# Patient Record
Sex: FEMALE | Race: WHITE | NOT HISPANIC OR LATINO | Employment: FULL TIME | URBAN - METROPOLITAN AREA
[De-identification: names, ages, dates, MRNs, and addresses within clinical notes are randomized per-mention and may not be internally consistent; named-entity substitution may affect disease eponyms.]

---

## 2018-03-09 ENCOUNTER — HOSPITAL ENCOUNTER (OUTPATIENT)
Dept: PREADMISSION TESTING | Facility: HOSPITAL | Age: 33
Discharge: HOME | End: 2018-03-09
Attending: PLASTIC SURGERY | Admitting: PLASTIC SURGERY

## 2019-02-01 ENCOUNTER — HOSPITAL ENCOUNTER (OUTPATIENT)
Dept: LAB | Facility: HOSPITAL | Age: 34
Discharge: HOME | End: 2019-02-01
Attending: NURSE PRACTITIONER | Admitting: NURSE PRACTITIONER

## 2019-07-01 RX ORDER — ESCITALOPRAM OXALATE 10 MG/1
TABLET ORAL
Qty: 90 TABLET | Refills: 0 | Status: SHIPPED | OUTPATIENT
Start: 2019-07-01 | End: 2019-10-01 | Stop reason: SDUPTHER

## 2019-10-01 RX ORDER — ESCITALOPRAM OXALATE 10 MG/1
TABLET ORAL
Qty: 90 TABLET | Refills: 3 | Status: SHIPPED | OUTPATIENT
Start: 2019-10-01 | End: 2019-11-06

## 2019-11-06 ENCOUNTER — OFFICE VISIT (OUTPATIENT)
Dept: FAMILY MEDICINE CLINIC | Facility: CLINIC | Age: 34
End: 2019-11-06

## 2019-11-06 VITALS
HEART RATE: 87 BPM | SYSTOLIC BLOOD PRESSURE: 124 MMHG | BODY MASS INDEX: 36.58 KG/M2 | DIASTOLIC BLOOD PRESSURE: 84 MMHG | HEIGHT: 69 IN | TEMPERATURE: 97.7 F | OXYGEN SATURATION: 97 % | WEIGHT: 247 LBS

## 2019-11-06 DIAGNOSIS — F41.9 ANXIETY: Primary | ICD-10-CM

## 2019-11-06 PROBLEM — F32.A DEPRESSION: Status: ACTIVE | Noted: 2018-04-11

## 2019-11-06 PROCEDURE — 99213 OFFICE O/P EST LOW 20 MIN: CPT | Performed by: FAMILY MEDICINE

## 2019-11-06 RX ORDER — ESCITALOPRAM OXALATE 20 MG/1
20 TABLET ORAL DAILY
Qty: 90 TABLET | Refills: 3 | Status: SHIPPED | OUTPATIENT
Start: 2019-11-06 | End: 2021-01-07 | Stop reason: SDUPTHER

## 2019-11-06 RX ORDER — MULTIVITAMIN
1 CAPSULE ORAL DAILY
COMMUNITY
Start: 2018-04-11

## 2019-11-06 NOTE — ASSESSMENT & PLAN NOTE
Worsening symptoms recently.  Going through a divorce.  She has been to see a counselor. Increase Lexapro from 10 mg to 20 mg.

## 2019-11-06 NOTE — PROGRESS NOTES
Subjective   Chief Complaint   Patient presents with   • Anxiety     Lexapro not helping as much anymore     Tania Wolfe is a 33 y.o. female.     H/O depression but anxiety has become worse recently. Recent divorce.      Anxiety   Presents for initial visit. Onset was 1 to 6 months ago. The problem has been gradually worsening. Symptoms include depressed mood and nervous/anxious behavior. Patient reports no chest pain, dizziness, palpitations, shortness of breath or suicidal ideas. Symptoms occur most days. The severity of symptoms is causing significant distress. The symptoms are aggravated by family issues.     Her past medical history is significant for depression. There is no history of suicide attempts. Past treatments include SSRIs. The treatment provided moderate relief. Compliance with prior treatments has been good.      Past Medical History:   Diagnosis Date   • Atrial fibrillation (CMS/HCC)     during pregnancies   • Bell palsy 01/21/2016   • Depression      Past Surgical History:   Procedure Laterality Date   • BREAST SURGERY  2018    breast reduction   • FIBULA SOFT TISSUE BIOPSY  1999    cyst removed     No Known Allergies  Social History     Socioeconomic History   • Marital status:      Spouse name: Not on file   • Number of children: Not on file   • Years of education: Not on file   • Highest education level: Not on file   Tobacco Use   • Smoking status: Never Smoker   • Smokeless tobacco: Never Used   Substance and Sexual Activity   • Alcohol use: Yes     Frequency: Monthly or less     Comment: caffeine 2c qd     Social History     Tobacco Use   Smoking Status Never Smoker   Smokeless Tobacco Never Used       family history is not on file. She was adopted.  Current Outpatient Medications on File Prior to Visit   Medication Sig Dispense Refill   • Multiple Vitamin (MULTIVITAMIN) capsule MULTIVITAMINS CAPS     • [DISCONTINUED] escitalopram (LEXAPRO) 10 MG tablet TAKE 1 TABLET BY MOUTH ONE  "TIME A DAY  90 tablet 3     No current facility-administered medications on file prior to visit.      Patient Active Problem List   Diagnosis   • Depression   • Anxiety       The following portions of the patient's history were reviewed and updated as appropriate: allergies, current medications, past family history, past medical history, past social history, past surgical history and problem list.    Review of Systems   Constitutional: Negative for chills and fever.   HENT: Negative for sinus pressure and sore throat.    Eyes: Negative for blurred vision.   Respiratory: Negative for cough and shortness of breath.    Cardiovascular: Negative for chest pain and palpitations.   Gastrointestinal: Negative for abdominal pain.   Endocrine: Negative for polyuria.   Skin: Negative for rash.   Neurological: Negative for dizziness and headache.   Hematological: Negative for adenopathy.   Psychiatric/Behavioral: Positive for depressed mood. Negative for suicidal ideas. The patient is nervous/anxious.        Objective   /84 (BP Location: Left arm, Patient Position: Sitting, Cuff Size: Adult)   Pulse 87   Temp 97.7 °F (36.5 °C) (Oral)   Ht 175.9 cm (69.25\")   Wt 112 kg (247 lb)   SpO2 97%   BMI 36.21 kg/m²   Physical Exam   Constitutional: She is oriented to person, place, and time. She appears well-developed. No distress.   HENT:   Head: Normocephalic.   Eyes: Conjunctivae and lids are normal.   Neck: Trachea normal. No thyroid mass and no thyromegaly present.   Cardiovascular: Normal rate, regular rhythm and normal heart sounds.   Pulmonary/Chest: Effort normal and breath sounds normal.   Lymphadenopathy:     She has no cervical adenopathy.   Neurological: She is alert and oriented to person, place, and time.   Skin: Skin is warm and dry.   Psychiatric: Her speech is normal and behavior is normal. She exhibits a depressed mood. She is attentive.       No visits with results within 1 Week(s) from this visit. "   Latest known visit with results is:   No results found for any previous visit.           Assessment/Plan   Problems Addressed this Visit        Other    Anxiety - Primary     Worsening symptoms recently.  Going through a divorce.  She has been to see a counselor. Increase Lexapro from 10 mg to 20 mg.                Tania was seen today for anxiety.    Diagnoses and all orders for this visit:    Anxiety    Other orders  -     escitalopram (LEXAPRO) 20 MG tablet; Take 1 tablet by mouth Daily.

## 2020-08-11 ENCOUNTER — OFFICE VISIT (OUTPATIENT)
Dept: FAMILY MEDICINE CLINIC | Facility: CLINIC | Age: 35
End: 2020-08-11

## 2020-08-11 ENCOUNTER — LAB (OUTPATIENT)
Dept: FAMILY MEDICINE CLINIC | Facility: CLINIC | Age: 35
End: 2020-08-11

## 2020-08-11 VITALS
BODY MASS INDEX: 36.95 KG/M2 | OXYGEN SATURATION: 96 % | SYSTOLIC BLOOD PRESSURE: 146 MMHG | TEMPERATURE: 97.3 F | WEIGHT: 252 LBS | HEART RATE: 83 BPM | DIASTOLIC BLOOD PRESSURE: 84 MMHG

## 2020-08-11 DIAGNOSIS — F32.A DEPRESSION, UNSPECIFIED DEPRESSION TYPE: Primary | ICD-10-CM

## 2020-08-11 DIAGNOSIS — Z13.6 SCREENING, ISCHEMIC HEART DISEASE: ICD-10-CM

## 2020-08-11 DIAGNOSIS — Z83.49 FAMILY HISTORY OF THYROID DISEASE: ICD-10-CM

## 2020-08-11 DIAGNOSIS — N92.1 MENORRHAGIA WITH IRREGULAR CYCLE: ICD-10-CM

## 2020-08-11 LAB
ALBUMIN SERPL-MCNC: 4.2 G/DL (ref 3.5–5.2)
ALBUMIN/GLOB SERPL: 1.4 G/DL
ALP SERPL-CCNC: 76 U/L (ref 39–117)
ALT SERPL W P-5'-P-CCNC: 19 U/L (ref 1–33)
ANION GAP SERPL CALCULATED.3IONS-SCNC: 11.6 MMOL/L (ref 5–15)
AST SERPL-CCNC: 19 U/L (ref 1–32)
BASOPHILS # BLD AUTO: 0.02 10*3/MM3 (ref 0–0.2)
BASOPHILS NFR BLD AUTO: 0.2 % (ref 0–1.5)
BILIRUB SERPL-MCNC: 0.7 MG/DL (ref 0–1.2)
BUN SERPL-MCNC: 12 MG/DL (ref 6–20)
BUN/CREAT SERPL: 20.3 (ref 7–25)
CALCIUM SPEC-SCNC: 9.8 MG/DL (ref 8.6–10.5)
CHLORIDE SERPL-SCNC: 101 MMOL/L (ref 98–107)
CHOLEST SERPL-MCNC: 181 MG/DL (ref 0–200)
CO2 SERPL-SCNC: 25.4 MMOL/L (ref 22–29)
CREAT SERPL-MCNC: 0.59 MG/DL (ref 0.57–1)
DEPRECATED RDW RBC AUTO: 37.8 FL (ref 37–54)
EOSINOPHIL # BLD AUTO: 0.26 10*3/MM3 (ref 0–0.4)
EOSINOPHIL NFR BLD AUTO: 2.8 % (ref 0.3–6.2)
ERYTHROCYTE [DISTWIDTH] IN BLOOD BY AUTOMATED COUNT: 12.7 % (ref 12.3–15.4)
GFR SERPL CREATININE-BSD FRML MDRD: 117 ML/MIN/1.73
GLOBULIN UR ELPH-MCNC: 3.1 GM/DL
GLUCOSE SERPL-MCNC: 132 MG/DL (ref 65–99)
HCT VFR BLD AUTO: 40.7 % (ref 34–46.6)
HDLC SERPL-MCNC: 31 MG/DL (ref 40–60)
HGB BLD-MCNC: 13.4 G/DL (ref 12–15.9)
IMM GRANULOCYTES # BLD AUTO: 0.08 10*3/MM3 (ref 0–0.05)
IMM GRANULOCYTES NFR BLD AUTO: 0.9 % (ref 0–0.5)
LDLC SERPL CALC-MCNC: 96 MG/DL (ref 0–100)
LDLC/HDLC SERPL: 3.1 {RATIO}
LYMPHOCYTES # BLD AUTO: 2.93 10*3/MM3 (ref 0.7–3.1)
LYMPHOCYTES NFR BLD AUTO: 32 % (ref 19.6–45.3)
MCH RBC QN AUTO: 27 PG (ref 26.6–33)
MCHC RBC AUTO-ENTMCNC: 32.9 G/DL (ref 31.5–35.7)
MCV RBC AUTO: 82.1 FL (ref 79–97)
MONOCYTES # BLD AUTO: 0.59 10*3/MM3 (ref 0.1–0.9)
MONOCYTES NFR BLD AUTO: 6.4 % (ref 5–12)
NEUTROPHILS NFR BLD AUTO: 5.28 10*3/MM3 (ref 1.7–7)
NEUTROPHILS NFR BLD AUTO: 57.7 % (ref 42.7–76)
NRBC BLD AUTO-RTO: 0 /100 WBC (ref 0–0.2)
PLATELET # BLD AUTO: 223 10*3/MM3 (ref 140–450)
PMV BLD AUTO: 11.3 FL (ref 6–12)
POTASSIUM SERPL-SCNC: 4.2 MMOL/L (ref 3.5–5.2)
PROT SERPL-MCNC: 7.3 G/DL (ref 6–8.5)
RBC # BLD AUTO: 4.96 10*6/MM3 (ref 3.77–5.28)
SODIUM SERPL-SCNC: 138 MMOL/L (ref 136–145)
TRIGL SERPL-MCNC: 270 MG/DL (ref 0–150)
TSH SERPL DL<=0.05 MIU/L-ACNC: 2.38 UIU/ML (ref 0.27–4.2)
VLDLC SERPL-MCNC: 54 MG/DL (ref 5–40)
WBC # BLD AUTO: 9.16 10*3/MM3 (ref 3.4–10.8)

## 2020-08-11 PROCEDURE — 99214 OFFICE O/P EST MOD 30 MIN: CPT | Performed by: FAMILY MEDICINE

## 2020-08-11 PROCEDURE — 84439 ASSAY OF FREE THYROXINE: CPT | Performed by: FAMILY MEDICINE

## 2020-08-11 PROCEDURE — 84480 ASSAY TRIIODOTHYRONINE (T3): CPT | Performed by: FAMILY MEDICINE

## 2020-08-11 PROCEDURE — 84443 ASSAY THYROID STIM HORMONE: CPT | Performed by: FAMILY MEDICINE

## 2020-08-11 PROCEDURE — 36415 COLL VENOUS BLD VENIPUNCTURE: CPT | Performed by: FAMILY MEDICINE

## 2020-08-11 PROCEDURE — 85025 COMPLETE CBC W/AUTO DIFF WBC: CPT | Performed by: FAMILY MEDICINE

## 2020-08-11 PROCEDURE — 80053 COMPREHEN METABOLIC PANEL: CPT | Performed by: FAMILY MEDICINE

## 2020-08-11 PROCEDURE — 80061 LIPID PANEL: CPT | Performed by: FAMILY MEDICINE

## 2020-08-11 NOTE — PROGRESS NOTES
Subjective   Chief Complaint   Patient presents with   • Hot Flashes     wants her thyroid checked    • Fatigue     Tania Wolfe is a 34 y.o. female.     She is adopted but through a genetic testing kit she has been able to find her biologic father and half-sisters.  She now know that there is a strong family history of thyroid disease and she has had some symptoms.     Fatigue   This is a new problem. The current episode started more than 1 month ago. The problem occurs constantly. The problem has been unchanged. Associated symptoms include fatigue. Pertinent negatives include no abdominal pain, chest pain, chills, coughing, fever, headaches, myalgias, nausea, rash, sore throat or swollen glands. Nothing aggravates the symptoms. She has tried nothing for the symptoms.   Depression   Visit Type: follow-up  Patient presents with the following symptoms: anhedonia.  Patient is not experiencing: decreased concentration, depressed mood, insomnia, palpitations, psychomotor agitation, psychomotor retardation, restlessness and shortness of breath.  Frequency of symptoms: rarely   Severity: mild   Sleep quality: fair  Compliance with medications:  %        Menstrual Problem   This is a new problem. The current episode started more than 1 month ago. The problem has been unchanged. Associated symptoms include fatigue. Pertinent negatives include no abdominal pain, chest pain, chills, coughing, fever, headaches, myalgias, nausea, rash, sore throat or swollen glands.      Past Medical History:   Diagnosis Date   • Atrial fibrillation (CMS/HCC)     during pregnancies   • Bell palsy 01/21/2016   • Depression      Past Surgical History:   Procedure Laterality Date   • BREAST SURGERY  2018    breast reduction   • FIBULA SOFT TISSUE BIOPSY  1999    cyst removed     No Known Allergies  Social History     Socioeconomic History   • Marital status:      Spouse name: Not on file   • Number of children: Not on file   •  Years of education: Not on file   • Highest education level: Not on file   Tobacco Use   • Smoking status: Never Smoker   • Smokeless tobacco: Never Used   Substance and Sexual Activity   • Alcohol use: Yes     Frequency: Monthly or less     Comment: caffeine 2c qd     Social History     Tobacco Use   Smoking Status Never Smoker   Smokeless Tobacco Never Used       family history is not on file. She was adopted.  Current Outpatient Medications on File Prior to Visit   Medication Sig Dispense Refill   • escitalopram (LEXAPRO) 20 MG tablet Take 1 tablet by mouth Daily. 90 tablet 3   • Multiple Vitamin (MULTIVITAMIN) capsule MULTIVITAMINS CAPS       No current facility-administered medications on file prior to visit.      Patient Active Problem List   Diagnosis   • Depression   • Anxiety       The following portions of the patient's history were reviewed and updated as appropriate: allergies, current medications, past family history, past medical history, past social history, past surgical history and problem list.    Review of Systems   Constitutional: Positive for fatigue. Negative for chills and fever.   HENT: Negative for sinus pressure, sore throat and swollen glands.    Eyes: Negative for blurred vision.   Respiratory: Negative for cough and shortness of breath.    Cardiovascular: Negative for chest pain and palpitations.   Gastrointestinal: Negative for abdominal pain and nausea.   Endocrine: Negative for polyuria.   Genitourinary: Positive for menstrual problem.   Musculoskeletal: Negative for myalgias.   Skin: Negative for rash.   Neurological: Negative for dizziness and headache.   Hematological: Negative for adenopathy.   Psychiatric/Behavioral: Negative for decreased concentration and depressed mood. The patient does not have insomnia.        Objective   /84 (BP Location: Left arm, Patient Position: Sitting, Cuff Size: Adult)   Pulse 83   Temp 97.3 °F (36.3 °C)   Wt 114 kg (252 lb)   SpO2 96%    BMI 36.95 kg/m²   Physical Exam   Constitutional: She is oriented to person, place, and time. She appears well-developed. No distress.   HENT:   Head: Normocephalic.   Eyes: Conjunctivae and lids are normal.   Neck: Trachea normal. No thyroid mass and no thyromegaly present.   Cardiovascular: Normal rate, regular rhythm and normal heart sounds.   Pulmonary/Chest: Effort normal and breath sounds normal.   Lymphadenopathy:     She has no cervical adenopathy.   Neurological: She is alert and oriented to person, place, and time.   Skin: Skin is warm and dry.   Psychiatric: She has a normal mood and affect. Her speech is normal and behavior is normal. She is attentive.       No visits with results within 1 Week(s) from this visit.   Latest known visit with results is:   No results found for any previous visit.           Assessment/Plan   Problems Addressed this Visit        Other    Depression - Primary    Relevant Orders    TSH    CBC & Differential      Other Visit Diagnoses     Family history of thyroid disease        Relevant Orders    TSH    Screening, ischemic heart disease        Relevant Orders    Comprehensive Metabolic Panel    Lipid Panel    Menorrhagia with irregular cycle        Relevant Orders    TSH    Comprehensive Metabolic Panel    CBC & Differential

## 2020-08-12 DIAGNOSIS — Z83.49 FAMILY HISTORY OF THYROID DISEASE: Primary | ICD-10-CM

## 2020-08-12 LAB
T3 SERPL-MCNC: 127 NG/DL (ref 80–200)
T4 FREE SERPL-MCNC: 1.02 NG/DL (ref 0.93–1.7)

## 2021-01-07 ENCOUNTER — TELEMEDICINE (OUTPATIENT)
Dept: FAMILY MEDICINE CLINIC | Facility: TELEHEALTH | Age: 36
End: 2021-01-07

## 2021-01-07 DIAGNOSIS — H66.001 NON-RECURRENT ACUTE SUPPURATIVE OTITIS MEDIA OF RIGHT EAR WITHOUT SPONTANEOUS RUPTURE OF TYMPANIC MEMBRANE: Primary | ICD-10-CM

## 2021-01-07 PROCEDURE — 99213 OFFICE O/P EST LOW 20 MIN: CPT | Performed by: NURSE PRACTITIONER

## 2021-01-07 RX ORDER — AMOXICILLIN AND CLAVULANATE POTASSIUM 875; 125 MG/1; MG/1
1 TABLET, FILM COATED ORAL 2 TIMES DAILY
Qty: 14 TABLET | Refills: 0 | Status: SHIPPED | OUTPATIENT
Start: 2021-01-07 | End: 2021-01-14

## 2021-01-07 NOTE — PROGRESS NOTES
Chief Complaint  Earache    Subjective          Tania Wolfe presents to [facility] for   Pt reports left earache x 5 days that has gradually worsened. She did have some sinus congestion initially, but that has resolved. She has a muffled sensation, cracking/popping with yawning and feels like fluid is in the ear. She has an otoscope and reports her TM is red. Denies fever, ottorhea, history of chronic ear infections.     Earache   There is pain in the left ear. This is a new problem. Episode onset: 5 days. The problem occurs constantly. The problem has been gradually worsening. There has been no fever. The pain is moderate. Associated symptoms include hearing loss (muffled). Pertinent negatives include no abdominal pain, coughing, diarrhea, ear discharge, headaches, neck pain, rash, rhinorrhea, sore throat or vomiting. Associated symptoms comments: Nasal congestion-resolved  . Treatments tried: sudafed, nasal spray, zyrtec, ear drops for pain. The treatment provided mild relief.       Objective   Vital Signs:   There were no vitals taken for this visit.    Physical Exam  Constitutional:       General: She is not in acute distress.     Appearance: Normal appearance. She is not ill-appearing, toxic-appearing or diaphoretic.   HENT:      Head: Normocephalic and atraumatic.      Right Ear: External ear normal. Tenderness (when pushing in front of the ear) present. No drainage.      Ears:      Comments: Viewed picture of TM from pt's otoscope. TM is erythematous, TM intact, no rupture.      Nose: No congestion or rhinorrhea.   Pulmonary:      Effort: Pulmonary effort is normal.   Neurological:      Mental Status: She is alert and oriented to person, place, and time.   Psychiatric:         Mood and Affect: Mood normal.         Speech: Speech normal.         Behavior: Behavior normal.        Result Review :                 Assessment and Plan    Problem List Items Addressed This Visit      None      Visit Diagnoses     Non-recurrent acute suppurative otitis media of right ear without spontaneous rupture of tympanic membrane    -  Primary    Relevant Medications    amoxicillin-clavulanate (Augmentin) 875-125 MG per tablet      Continue to use Flonase and allergy medicine of choice.   Alternate tylenol and motrin for pain and/or fever, stay hydrated and rest.   If symptoms worsen or do not improve follow up with your PCP or visit your nearest Urgent Care Center or ER.    I spent 25 minutes caring for Tania on this date of service. This time includes time spent by me in the following activities:preparing for the visit, obtaining and/or reviewing a separately obtained history, performing a medically appropriate examination and/or evaluation , counseling and educating the patient/family/caregiver, ordering medications, tests, or procedures and documenting information in the medical record  Follow Up   No follow-ups on file.  Patient was given instructions and counseling regarding her condition or for health maintenance advice. Please see specific information pulled into the AVS if appropriate.

## 2021-01-07 NOTE — PATIENT INSTRUCTIONS
Continue to use Flonase and allergy medicine of choice.   Alternate tylenol and motrin for pain and/or fever, stay hydrated and rest.   If symptoms worsen or do not improve follow up with your PCP or visit your nearest Urgent Care Center or ER.        Otitis Media, Adult    Otitis media occurs when there is inflammation and fluid in the middle ear. Your middle ear is a part of the ear that contains bones for hearing as well as air that helps send sounds to your brain.  What are the causes?  This condition is caused by a blockage in the eustachian tube. This tube drains fluid from the ear to the back of the nose (nasopharynx). A blockage in this tube can be caused by an object or by swelling (edema) in the tube. Problems that can cause a blockage include:  · A cold or other upper respiratory infection.  · Allergies.  · An irritant, such as tobacco smoke.  · Enlarged adenoids. The adenoids are areas of soft tissue located high in the back of the throat, behind the nose and the roof of the mouth.  · A mass in the nasopharynx.  · Damage to the ear caused by pressure changes (barotrauma).  What are the signs or symptoms?  Symptoms of this condition include:  · Ear pain.  · A fever.  · Decreased hearing.  · A headache.  · Tiredness (lethargy).  · Fluid leaking from the ear.  · Ringing in the ear.  How is this diagnosed?  This condition is diagnosed with a physical exam. During the exam your health care provider will use an instrument called an otoscope to look into your ear and check for redness, swelling, and fluid. He or she will also ask about your symptoms.  Your health care provider may also order tests, such as:  · A test to check the movement of the eardrum (pneumatic otoscopy). This test is done by squeezing a small amount of air into the ear.  · A test that changes air pressure in the middle ear to check how well the eardrum moves and whether the eustachian tube is working (tympanogram).  How is this  treated?  This condition usually goes away on its own within 3-5 days. But if the condition is caused by a bacteria infection and does not go away own its own, or keeps coming back, your health care provider may:  · Prescribe antibiotic medicines to treat the infection.  · Prescribe or recommend medicines to control pain.  Follow these instructions at home:  · Take over-the-counter and prescription medicines only as told by your health care provider.  · If you were prescribed an antibiotic medicine, take it as told by your health care provider. Do not stop taking the antibiotic even if you start to feel better.  · Keep all follow-up visits as told by your health care provider. This is important.  Contact a health care provider if:  · You have bleeding from your nose.  · There is a lump on your neck.  · You are not getting better in 5 days.  · You feel worse instead of better.  Get help right away if:  · You have severe pain that is not controlled with medicine.  · You have swelling, redness, or pain around your ear.  · You have stiffness in your neck.  · A part of your face is paralyzed.  · The bone behind your ear (mastoid) is tender when you touch it.  · You develop a severe headache.  Summary  · Otitis media is redness, soreness, and swelling of the middle ear.  · This condition usually goes away on its own within 3-5 days.  · If the problem does not go away in 3-5 days, your health care provider may prescribe or recommend medicines to treat your symptoms.  · If you were prescribed an antibiotic medicine, take it as told by your health care provider.  This information is not intended to replace advice given to you by your health care provider. Make sure you discuss any questions you have with your health care provider.  Document Revised: 11/30/2018 Document Reviewed: 12/08/2017  [employer] Patient Education © 2020 [employer]

## 2021-01-08 RX ORDER — ESCITALOPRAM OXALATE 20 MG/1
20 TABLET ORAL DAILY
Qty: 90 TABLET | Refills: 3 | OUTPATIENT
Start: 2021-01-08

## 2021-01-08 RX ORDER — ESCITALOPRAM OXALATE 20 MG/1
20 TABLET ORAL DAILY
Qty: 90 TABLET | Refills: 3 | Status: SHIPPED | OUTPATIENT
Start: 2021-01-08 | End: 2021-09-09 | Stop reason: SDUPTHER

## 2021-05-26 ENCOUNTER — TELEPHONE (OUTPATIENT)
Dept: CARDIOLOGY | Facility: CLINIC | Age: 36
End: 2021-05-26

## 2021-06-03 ENCOUNTER — LAB (OUTPATIENT)
Dept: LAB | Facility: HOSPITAL | Age: 36
End: 2021-06-03

## 2021-06-03 ENCOUNTER — OFFICE VISIT (OUTPATIENT)
Dept: CARDIOLOGY | Facility: CLINIC | Age: 36
End: 2021-06-03

## 2021-06-03 VITALS
HEART RATE: 88 BPM | DIASTOLIC BLOOD PRESSURE: 86 MMHG | SYSTOLIC BLOOD PRESSURE: 132 MMHG | HEIGHT: 70 IN | OXYGEN SATURATION: 99 % | BODY MASS INDEX: 36.22 KG/M2 | WEIGHT: 253 LBS

## 2021-06-03 DIAGNOSIS — I48.0 PAROXYSMAL ATRIAL FIBRILLATION (HCC): ICD-10-CM

## 2021-06-03 DIAGNOSIS — R53.83 FATIGUE, UNSPECIFIED TYPE: ICD-10-CM

## 2021-06-03 DIAGNOSIS — E66.9 OBESITY (BMI 30-39.9): ICD-10-CM

## 2021-06-03 DIAGNOSIS — R73.9 HYPERGLYCEMIA: ICD-10-CM

## 2021-06-03 DIAGNOSIS — R00.2 PALPITATIONS: Primary | ICD-10-CM

## 2021-06-03 DIAGNOSIS — E78.5 DYSLIPIDEMIA: ICD-10-CM

## 2021-06-03 DIAGNOSIS — R00.2 PALPITATIONS: ICD-10-CM

## 2021-06-03 LAB
ALBUMIN SERPL-MCNC: 4.2 G/DL (ref 3.5–5.2)
ALBUMIN/GLOB SERPL: 1.4 G/DL
ALP SERPL-CCNC: 79 U/L (ref 39–117)
ALT SERPL W P-5'-P-CCNC: 13 U/L (ref 1–33)
ANION GAP SERPL CALCULATED.3IONS-SCNC: 8.2 MMOL/L (ref 5–15)
AST SERPL-CCNC: 16 U/L (ref 1–32)
BILIRUB SERPL-MCNC: 0.6 MG/DL (ref 0–1.2)
BUN SERPL-MCNC: 10 MG/DL (ref 6–20)
BUN/CREAT SERPL: 13.2 (ref 7–25)
CALCIUM SPEC-SCNC: 9.2 MG/DL (ref 8.6–10.5)
CHLORIDE SERPL-SCNC: 103 MMOL/L (ref 98–107)
CO2 SERPL-SCNC: 26.8 MMOL/L (ref 22–29)
CREAT SERPL-MCNC: 0.76 MG/DL (ref 0.57–1)
GFR SERPL CREATININE-BSD FRML MDRD: 87 ML/MIN/1.73
GLOBULIN UR ELPH-MCNC: 3 GM/DL
GLUCOSE SERPL-MCNC: 98 MG/DL (ref 65–99)
HBA1C MFR BLD: 5.8 % (ref 3.5–5.6)
MAGNESIUM SERPL-MCNC: 2 MG/DL (ref 1.6–2.6)
POTASSIUM SERPL-SCNC: 4 MMOL/L (ref 3.5–5.2)
PROT SERPL-MCNC: 7.2 G/DL (ref 6–8.5)
SODIUM SERPL-SCNC: 138 MMOL/L (ref 136–145)
TSH SERPL DL<=0.05 MIU/L-ACNC: 2.07 UIU/ML (ref 0.27–4.2)

## 2021-06-03 PROCEDURE — 80053 COMPREHEN METABOLIC PANEL: CPT

## 2021-06-03 PROCEDURE — 83735 ASSAY OF MAGNESIUM: CPT

## 2021-06-03 PROCEDURE — 84443 ASSAY THYROID STIM HORMONE: CPT

## 2021-06-03 PROCEDURE — 83036 HEMOGLOBIN GLYCOSYLATED A1C: CPT

## 2021-06-03 PROCEDURE — 93000 ELECTROCARDIOGRAM COMPLETE: CPT | Performed by: INTERNAL MEDICINE

## 2021-06-03 PROCEDURE — 36415 COLL VENOUS BLD VENIPUNCTURE: CPT

## 2021-06-03 PROCEDURE — 99204 OFFICE O/P NEW MOD 45 MIN: CPT | Performed by: INTERNAL MEDICINE

## 2021-06-03 RX ORDER — CETIRIZINE HYDROCHLORIDE 10 MG/1
10 TABLET ORAL DAILY PRN
COMMUNITY
End: 2022-09-13

## 2021-06-03 RX ORDER — DILTIAZEM HYDROCHLORIDE 120 MG/1
120 CAPSULE, COATED, EXTENDED RELEASE ORAL DAILY
Qty: 30 CAPSULE | Refills: 11 | Status: SHIPPED | OUTPATIENT
Start: 2021-06-03 | End: 2021-06-28

## 2021-06-03 NOTE — PROGRESS NOTES
Cardiology Consult Note    Patient Identification:  Name: Tania Wolfe  Age: 35 y.o.  Sex: female  :  1985  MRN: [id number]             Requesting Physician : Dr. Madison Cruz    Reason for Consultation / Chief Complaint : Palpitations, A. fib    History of Present Illness:      This is a 35-year-old with PMH of    Paroxysmal atrial fibrillation  Facial droop, Bell's palsy  Dyslipidemia with low HDL at 31  Depression  Obesity with BMI over 36  Breast surgery  NKDA  Positive family history of CAD in mother    Here for evaluation and treatment of A. fib and palpitations.  Patient reportedly had A. fib during pregnancy in  and repeat in .  States she had no documented A. fib since .  Has been having pounding sensation and skipped beats with shortness of breath and fatigue.  Patient reportedly took metoprolol and Cardizem to help better.  Denies any loss of consciousness.  Patient's arterial blood pressure is 132/86, heart rate 88 bpm, O2 sat of 99% on room air.  BMI is over 36.  Labs from 2020 revealed normal TSH, T3, free T4, CMP with a glucose elevated at 132, lipid profile with cholesterol 181 triglycerides 270 HDL low at 31 LDL 96.      Assessment:      Palpitations  Fatigue  Hyperglycemia  Paroxysmal A. Fib  Dyslipidemia with low HDL      Recommendations / Plan:        Reviewed EKG results with patient  We will check labs including TSH magnesium and CMP to evaluate palpitations.  Patient has hyperglycemia we will check A1c.  We will check echocardiogram to rule out structural heart disease.  Patient's [id number] score is low, therefore we will hold off on anticoagulation.  Reviewed low HDL counseled on walking exercise.  Reviewed BMI over 36 counseled on weight loss diet and exercise.           Diagnosis Plan   1. Palpitations  ECG 12 Lead    Adult Transthoracic Echo Complete W/ Cont if Necessary Per Protocol    Hemoglobin A1c    TSH    Magnesium    Comprehensive Metabolic  Panel   2. Fatigue, unspecified type  Adult Transthoracic Echo Complete W/ Cont if Necessary Per Protocol    Hemoglobin A1c    TSH    Magnesium    Comprehensive Metabolic Panel   3. Hyperglycemia  Adult Transthoracic Echo Complete W/ Cont if Necessary Per Protocol    Hemoglobin A1c    TSH    Magnesium    Comprehensive Metabolic Panel   4. Paroxysmal atrial fibrillation (CMS/HCC)  Adult Transthoracic Echo Complete W/ Cont if Necessary Per Protocol    Hemoglobin A1c    TSH    Magnesium    Comprehensive Metabolic Panel   5. Dyslipidemia     6. Obesity (BMI 30-39.9)                Past Medical History:  Past Medical History:   Diagnosis Date   • Atrial fibrillation (CMS/HCC)     during pregnancies   • Bell palsy 01/21/2016   • Depression      Past Surgical History:  Past Surgical History:   Procedure Laterality Date   • BREAST SURGERY  2018    breast reduction   • FIBULA SOFT TISSUE BIOPSY  1999    cyst removed      Allergies:  No Known Allergies  Home Meds:    Current Meds:     Current Outpatient Medications:   •  cetirizine (zyrTEC) 10 MG tablet, Take 10 mg by mouth Daily., Disp: , Rfl:   •  escitalopram (Lexapro) 20 MG tablet, Take 1 tablet by mouth Daily., Disp: 90 tablet, Rfl: 3  •  Multiple Vitamin (MULTIVITAMIN) capsule, MULTIVITAMINS CAPS, Disp: , Rfl:   •  Rhubarb (ESTROVEN MENOPAUSE RELIEF PO), , Disp: , Rfl:   •  dilTIAZem CD (CARDIZEM CD) 120 MG 24 hr capsule, Take 1 capsule by mouth Daily., Disp: 30 capsule, Rfl: 11  Social History:   Social History     Tobacco Use   • Smoking status: Never Smoker   • Smokeless tobacco: Never Used   Substance Use Topics   • Alcohol use: Yes     Comment: occ      Family History:  Family History   Adopted: Yes   Problem Relation Age of Onset   • Heart disease Mother    • Thyroid disease Father         Review of Systems : Review of Systems   Constitutional: Negative for fever and malaise/fatigue.   HENT: Negative for congestion and hearing loss.    Eyes: Negative for double  "vision and visual disturbance.   Cardiovascular: Positive for palpitations. Negative for chest pain, claudication, dyspnea on exertion, leg swelling and syncope.   Respiratory: Positive for shortness of breath. Negative for cough.    Endocrine: Negative for cold intolerance.   Skin: Negative for color change and rash.   Musculoskeletal: Negative for arthritis and joint pain.   Gastrointestinal: Negative for abdominal pain and heartburn.   Genitourinary: Negative for hematuria.   Neurological: Positive for excessive daytime sleepiness. Negative for dizziness.   Psychiatric/Behavioral: Positive for depression. The patient is nervous/anxious.    All other systems reviewed and are negative.              Constitutional:       Physical Exam   /86   Pulse 88   Ht 177.8 cm (70\")   Wt 115 kg (253 lb)   SpO2 99%   BMI 36.30 kg/m²   Physical Exam  General:  Appears in no acute distress, pleasant obese  Eyes: Sclera is anicteric,  conjunctiva is clear   HEENT:  No JVD. Thyroid not visibly enlarged. No mucosal pallor or cyanosis  Respiratory: Respirations regular and unlabored at rest.  Bilaterally good breath sounds, with good air entry in all fields. No crackles, rubs or wheezes auscultated  Cardiovascular: S1,S2 Regular rate and rhythm. No murmur, rub or gallop auscultated. No pretibial pitting edema  Gastrointestinal: Abdomen soft, flat, non tender. Bowel sounds present.   Musculoskeletal:  No abnormal movements  Extremities: No digital clubbing or cyanosis  Skin: Color pink. Skin warm and dry to touch. No rashes  No xanthoma  Neuro: Alert and awake, no lateralizing deficits appreciated    Cardiographics  ECG: EKG tracing was  personally reviewed/interpreted by me    ECG 12 Lead    Date/Time: 6/3/2021 1:12 PM  Performed by: Erlin Rahman MD  Authorized by: Erlin Rahman MD   Comparison: compared with previous ECG from 3/9/2018  Comparison to previous ECG: EKG done today reviewed/interpreted " by me shows sinus rhythm with rate of 81 bpm, no new change compared to EKG from 3/9/2018            Imaging  Chest X-ray:   Imaging Results (Last 24 Hours)     ** No results found for the last 24 hours. **          Lab Review: I have reviewed the labs              @[email or web address]@                  [facility] Lyndon Rahman MD  6/11/2021, 16:57 EDT      EMR Dragon/Transcription:   Dictated utilizing Dragon dictation

## 2021-06-25 NOTE — TELEPHONE ENCOUNTER
Pt called she's been on Cardizem 120mg daily for three wks still having resting -120.         Should be working by now?       Used to take Labetalol and it worked well, should she change meds?

## 2021-06-28 RX ORDER — LABETALOL 100 MG/1
100 TABLET, FILM COATED ORAL 2 TIMES DAILY
COMMUNITY
End: 2021-06-28 | Stop reason: SDUPTHER

## 2021-06-28 RX ORDER — LABETALOL 100 MG/1
100 TABLET, FILM COATED ORAL 2 TIMES DAILY
Qty: 180 TABLET | Refills: 1 | OUTPATIENT
Start: 2021-06-28 | End: 2021-10-12 | Stop reason: HOSPADM

## 2021-06-28 NOTE — TELEPHONE ENCOUNTER
100mg bid per Dr Rahman  Called informed pt,   Called into [facility]        Called in ,    Please sign

## 2021-06-29 ENCOUNTER — HOSPITAL ENCOUNTER (OUTPATIENT)
Dept: CARDIOLOGY | Facility: HOSPITAL | Age: 36
Discharge: HOME | End: 2021-06-29
Admitting: INTERNAL MEDICINE

## 2021-06-29 VITALS
WEIGHT: 253 LBS | BODY MASS INDEX: 36.22 KG/M2 | SYSTOLIC BLOOD PRESSURE: 117 MMHG | HEIGHT: 70 IN | DIASTOLIC BLOOD PRESSURE: 59 MMHG

## 2021-06-29 DIAGNOSIS — R73.9 HYPERGLYCEMIA: ICD-10-CM

## 2021-06-29 DIAGNOSIS — R53.83 FATIGUE, UNSPECIFIED TYPE: ICD-10-CM

## 2021-06-29 DIAGNOSIS — R00.2 PALPITATIONS: ICD-10-CM

## 2021-06-29 DIAGNOSIS — I48.0 PAROXYSMAL ATRIAL FIBRILLATION (HCC): ICD-10-CM

## 2021-06-29 LAB
BH CV ECHO MEAS - ACS: 2.2 CM
BH CV ECHO MEAS - AO MAX PG (FULL): 3.4 MMHG
BH CV ECHO MEAS - AO MAX PG: 8.8 MMHG
BH CV ECHO MEAS - AO MEAN PG (FULL): 1.8 MMHG
BH CV ECHO MEAS - AO MEAN PG: 4.8 MMHG
BH CV ECHO MEAS - AO ROOT AREA (BSA CORRECTED): 1.2
BH CV ECHO MEAS - AO ROOT AREA: 5.7 CM^2
BH CV ECHO MEAS - AO ROOT DIAM: 2.7 CM
BH CV ECHO MEAS - AO V2 MAX: 148.2 CM/SEC
BH CV ECHO MEAS - AO V2 MEAN: 105 CM/SEC
BH CV ECHO MEAS - AO V2 VTI: 28.6 CM
BH CV ECHO MEAS - ASC AORTA: 3 CM
BH CV ECHO MEAS - AVA(I,A): 2.2 CM^2
BH CV ECHO MEAS - AVA(I,D): 2.2 CM^2
BH CV ECHO MEAS - AVA(V,A): 2.1 CM^2
BH CV ECHO MEAS - AVA(V,D): 2.1 CM^2
BH CV ECHO MEAS - BSA(HAYCOCK): 2.4 M^2
BH CV ECHO MEAS - BSA: 2.3 M^2
BH CV ECHO MEAS - BZI_BMI: 35.9 KILOGRAMS/M^2
BH CV ECHO MEAS - BZI_METRIC_HEIGHT: 177.8 CM
BH CV ECHO MEAS - BZI_METRIC_WEIGHT: 113.4 KG
BH CV ECHO MEAS - EDV(CUBED): 110 ML
BH CV ECHO MEAS - EDV(MOD-SP4): 65.1 ML
BH CV ECHO MEAS - EDV(TEICH): 107 ML
BH CV ECHO MEAS - EF(CUBED): 87.2 %
BH CV ECHO MEAS - EF(MOD-SP4): 68.4 %
BH CV ECHO MEAS - EF(TEICH): 80.8 %
BH CV ECHO MEAS - ESV(CUBED): 14.1 ML
BH CV ECHO MEAS - ESV(MOD-SP4): 20.6 ML
BH CV ECHO MEAS - ESV(TEICH): 20.5 ML
BH CV ECHO MEAS - FS: 49.5 %
BH CV ECHO MEAS - IVS/LVPW: 1
BH CV ECHO MEAS - IVSD: 1 CM
BH CV ECHO MEAS - LA DIMENSION: 3.8 CM
BH CV ECHO MEAS - LA/AO: 1.4
BH CV ECHO MEAS - LV DIASTOLIC VOL/BSA (35-75): 28.4 ML/M^2
BH CV ECHO MEAS - LV MASS(C)D: 181.2 GRAMS
BH CV ECHO MEAS - LV MASS(C)DI: 79 GRAMS/M^2
BH CV ECHO MEAS - LV MAX PG: 5.4 MMHG
BH CV ECHO MEAS - LV MEAN PG: 3 MMHG
BH CV ECHO MEAS - LV SYSTOLIC VOL/BSA (12-30): 9 ML/M^2
BH CV ECHO MEAS - LV V1 MAX: 115.9 CM/SEC
BH CV ECHO MEAS - LV V1 MEAN: 82.2 CM/SEC
BH CV ECHO MEAS - LV V1 VTI: 24.1 CM
BH CV ECHO MEAS - LVIDD: 4.8 CM
BH CV ECHO MEAS - LVIDS: 2.4 CM
BH CV ECHO MEAS - LVOT AREA: 2.6 CM^2
BH CV ECHO MEAS - LVOT DIAM: 1.8 CM
BH CV ECHO MEAS - LVPWD: 1.1 CM
BH CV ECHO MEAS - MV A MAX VEL: 58.8 CM/SEC
BH CV ECHO MEAS - MV DEC SLOPE: 283.7 CM/SEC^2
BH CV ECHO MEAS - MV DEC TIME: 0.28 SEC
BH CV ECHO MEAS - MV E MAX VEL: 79.5 CM/SEC
BH CV ECHO MEAS - MV E/A: 1.4
BH CV ECHO MEAS - MV MAX PG: 3 MMHG
BH CV ECHO MEAS - MV MEAN PG: 1 MMHG
BH CV ECHO MEAS - MV V2 MAX: 86.4 CM/SEC
BH CV ECHO MEAS - MV V2 MEAN: 47.2 CM/SEC
BH CV ECHO MEAS - MV V2 VTI: 26.1 CM
BH CV ECHO MEAS - MVA(VTI): 2.4 CM^2
BH CV ECHO MEAS - PA ACC TIME: 0.11 SEC
BH CV ECHO MEAS - PA PR(ACCEL): 30.7 MMHG
BH CV ECHO MEAS - RV MAX PG: 3.1 MMHG
BH CV ECHO MEAS - RV MEAN PG: 1.8 MMHG
BH CV ECHO MEAS - RV V1 MAX: 88.3 CM/SEC
BH CV ECHO MEAS - RV V1 MEAN: 65 CM/SEC
BH CV ECHO MEAS - RV V1 VTI: 14.6 CM
BH CV ECHO MEAS - RVDD: 2.9 CM
BH CV ECHO MEAS - SI(AO): 71.3 ML/M^2
BH CV ECHO MEAS - SI(CUBED): 41.8 ML/M^2
BH CV ECHO MEAS - SI(LVOT): 27.7 ML/M^2
BH CV ECHO MEAS - SI(MOD-SP4): 19.4 ML/M^2
BH CV ECHO MEAS - SI(TEICH): 37.7 ML/M^2
BH CV ECHO MEAS - SV(AO): 163.7 ML
BH CV ECHO MEAS - SV(CUBED): 95.8 ML
BH CV ECHO MEAS - SV(LVOT): 63.5 ML
BH CV ECHO MEAS - SV(MOD-SP4): 44.5 ML
BH CV ECHO MEAS - SV(TEICH): 86.5 ML
MAXIMAL PREDICTED HEART RATE: 185 BPM
STRESS TARGET HR: 157 BPM

## 2021-06-29 PROCEDURE — 93306 TTE W/DOPPLER COMPLETE: CPT | Performed by: INTERNAL MEDICINE

## 2021-06-29 PROCEDURE — 93306 TTE W/DOPPLER COMPLETE: CPT

## 2021-09-09 ENCOUNTER — OFFICE VISIT (OUTPATIENT)
Dept: CARDIOLOGY | Facility: CLINIC | Age: 36
End: 2021-09-09

## 2021-09-09 VITALS
WEIGHT: 246 LBS | BODY MASS INDEX: 35.22 KG/M2 | HEIGHT: 70 IN | DIASTOLIC BLOOD PRESSURE: 78 MMHG | HEART RATE: 81 BPM | OXYGEN SATURATION: 98 % | SYSTOLIC BLOOD PRESSURE: 120 MMHG

## 2021-09-09 DIAGNOSIS — E78.5 DYSLIPIDEMIA: ICD-10-CM

## 2021-09-09 DIAGNOSIS — E66.9 OBESITY (BMI 30-39.9): ICD-10-CM

## 2021-09-09 DIAGNOSIS — R00.2 PALPITATIONS: Primary | ICD-10-CM

## 2021-09-09 DIAGNOSIS — I48.0 PAROXYSMAL ATRIAL FIBRILLATION (HCC): ICD-10-CM

## 2021-09-09 DIAGNOSIS — R73.9 HYPERGLYCEMIA: ICD-10-CM

## 2021-09-09 PROCEDURE — 99213 OFFICE O/P EST LOW 20 MIN: CPT | Performed by: INTERNAL MEDICINE

## 2021-09-09 RX ORDER — DILTIAZEM HYDROCHLORIDE 120 MG/1
120 CAPSULE, COATED, EXTENDED RELEASE ORAL DAILY
COMMUNITY
Start: 2021-07-13 | End: 2021-09-22 | Stop reason: SDUPTHER

## 2021-09-09 NOTE — PROGRESS NOTES
Subjective:     Encounter Date:09/09/2021      Patient ID: Tania Wolfe is a 35 y.o. female.    Chief Complaint : Follow-up for palpitations, A. 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  Patient reportedly had A. fib during pregnancy in 2008 and repeat in 2011.  States she had no documented A. fib since 2011.  No patient has a iWatch which shows a rhythm strip and when she is having palpitations she has printout showing A. fib but it lasts less than 10 beats at the time.  Patient's arterial blood pressure is 120/78, heart rate 80, O2 to sat of 90 % on room air.  BMI is over 35  Labs from August 2020 revealed normal TSH, T3, free T4, CMP with a glucose elevated at 132, lipid profile with cholesterol 181 triglycerides 270 HDL low at 31 LDL 96.  Labs from 6/3/2021 revealed TSH normal at 2.07, normal CMP normal magnesium.  A1c of 5.8.      Assessment:      Palpitations  Fatigue  Hyperglycemia, A1c of 5.8 suggestive of prediabetes  Paroxysmal A. Fib  Dyslipidemia with low HDL      Recommendations / Plan:        Reviewed A. fib with patient.  Patient had echocardiogram done 6/29/2021 which revealed normal LV systolic function  Patient's [id number] score is low, therefore we will hold off on anticoagulation.  Reviewed low HDL counseled on walking exercise.  Reviewed BMI over 35 counseled on weight loss diet and exercise.  We will continue Cardizem and labetalol as tolerated for paroxysmal A. fib.  Discussed about ablation and anticoagulation with patient.  If she develops hypertension or diabetes we will put her on long-term anticoagulation.  Discussed about COVID-19 vaccination.  Patient did well  with both the doses of   vaccine.  We will follow-up in 1 year or sooner if needed.        Procedures EKG done 6/3/2021 reviewed/interpreted by me reveals sinus rhythm with rate of 81 bpm    The following portions of the patient's history were reviewed and updated as appropriate: allergies, current medications, past family history, past medical history, past social history, past surgical history and problem list.    Assessment:         MDM     Diagnosis Plan   1. Palpitations     2. Paroxysmal atrial fibrillation (CMS/HCC)     3. Hyperglycemia     4. Dyslipidemia     5. Obesity (BMI 30-39.9)            Plan:               Past Medical History:  Past Medical History:   Diagnosis Date   • Atrial fibrillation (CMS/HCC)     during pregnancies   • Bell palsy 01/21/2016   • Depression      Past Surgical History:  Past Surgical History:   Procedure Laterality Date   • BREAST SURGERY  2018    breast reduction   • FIBULA SOFT TISSUE BIOPSY  1999    cyst removed      Allergies:  No Known Allergies  Home Meds:  Current Meds:     Current Outpatient Medications:   •  cetirizine (zyrTEC) 10 MG tablet, Take 10 mg by mouth Daily., Disp: , Rfl:   •  dilTIAZem CD (CARDIZEM CD) 120 MG 24 hr capsule, Take 120 mg by mouth Daily., Disp: , Rfl:   •  escitalopram (Lexapro) 20 MG tablet, Take 1 tablet by mouth Daily., Disp: 90 tablet, Rfl: 2  •  labetalol (NORMODYNE) 100 MG tablet, Take 1 tablet by mouth 2 (Two) Times a Day., Disp: 180 tablet, Rfl: 1  •  labetalol (NORMODYNE) 100 MG tablet, Take 1 tablet by mouth 2 (two) times a day., Disp: 180 tablet, Rfl: 1  •  Multiple Vitamin (MULTIVITAMIN) capsule, MULTIVITAMINS CAPS, Disp: , Rfl:   •  Rhubarb (ESTROVEN MENOPAUSE RELIEF PO), , Disp: , Rfl:   Social History:   Social History     Tobacco Use   • Smoking status: Never Smoker   • Smokeless tobacco: Never Used   Substance Use Topics   • Alcohol use: Yes     Comment: occ      Family History:  Family History   Adopted: Yes   Problem Relation  "Age of Onset   • Heart disease Mother    • Thyroid disease Father               Review of Systems   Constitutional: Negative for fever and malaise/fatigue.   HENT: Negative for congestion and hearing loss.    Eyes: Negative for double vision and visual disturbance.   Cardiovascular: Positive for palpitations. Negative for chest pain, claudication, dyspnea on exertion, leg swelling and syncope.   Respiratory: Negative for cough and shortness of breath.    Endocrine: Negative for cold intolerance.   Skin: Negative for color change and rash.   Musculoskeletal: Negative for arthritis and joint pain.   Gastrointestinal: Negative for abdominal pain and heartburn.   Genitourinary: Negative for hematuria.   Neurological: Negative for excessive daytime sleepiness, dizziness and numbness.   Psychiatric/Behavioral: Negative for depression. The patient is not nervous/anxious.    All other systems reviewed and are negative.         Objective:     Physical Exam  /78 (BP Location: Left arm, Patient Position: Sitting, Cuff Size: Large Adult)   Pulse 81   Ht 177.8 cm (70\")   Wt 112 kg (246 lb)   SpO2 98%   BMI 35.30 kg/m²   General:  Appears in no acute distress, pleasant obese  Eyes: Sclera is anicteric,  conjunctiva is clear   HEENT:  No JVD.  No carotid bruits  Respiratory: Respirations regular and unlabored at rest.  Clear to auscultation  Cardiovascular: S1,S2 Regular rate and rhythm. No murmur, rub or gallop auscultated.   Extremities: No digital clubbing or cyanosis, no edema  Skin: Color pink. Skin warm and dry to touch. No rashes  No xanthoma  Neuro: Alert and awake, no lateralizing deficits appreciated    Lab Reviewed:                  "

## 2021-09-10 ENCOUNTER — PATIENT MESSAGE (OUTPATIENT)
Dept: CARDIOLOGY | Facility: CLINIC | Age: 36
End: 2021-09-10

## 2021-09-14 DIAGNOSIS — I48.0 PAROXYSMAL ATRIAL FIBRILLATION (HCC): Primary | ICD-10-CM

## 2021-09-22 RX ORDER — DILTIAZEM HYDROCHLORIDE 120 MG/1
120 CAPSULE, COATED, EXTENDED RELEASE ORAL DAILY
Qty: 30 CAPSULE | Refills: 11 | Status: SHIPPED | OUTPATIENT
Start: 2021-09-22 | End: 2022-09-13

## 2021-09-22 RX ORDER — DILTIAZEM HYDROCHLORIDE 120 MG/1
120 CAPSULE, COATED, EXTENDED RELEASE ORAL DAILY
Qty: 30 CAPSULE | Refills: 11 | Status: CANCELLED | OUTPATIENT
Start: 2021-09-22

## 2021-09-22 NOTE — TELEPHONE ENCOUNTER
Rx Refill Note  Requested Prescriptions     Pending Prescriptions Disp Refills   • dilTIAZem CD (CARDIZEM CD) 120 MG 24 hr capsule 30 capsule 11     Sig: Take 1 capsule by mouth Daily.      Last office visit with prescribing clinician: 9/9/2021      Next office visit with prescribing clinician: 9/13/2022            Kady Novak MA  09/22/21, 16:26 EDT

## 2021-10-04 ENCOUNTER — OFFICE VISIT (OUTPATIENT)
Dept: CARDIOLOGY | Facility: CLINIC | Age: 36
End: 2021-10-04

## 2021-10-04 VITALS
OXYGEN SATURATION: 100 % | WEIGHT: 248 LBS | HEART RATE: 78 BPM | SYSTOLIC BLOOD PRESSURE: 139 MMHG | BODY MASS INDEX: 35.58 KG/M2 | DIASTOLIC BLOOD PRESSURE: 94 MMHG

## 2021-10-04 DIAGNOSIS — E78.5 DYSLIPIDEMIA: ICD-10-CM

## 2021-10-04 DIAGNOSIS — I48.0 PAROXYSMAL ATRIAL FIBRILLATION (HCC): Primary | ICD-10-CM

## 2021-10-04 DIAGNOSIS — R00.2 PALPITATIONS: ICD-10-CM

## 2021-10-04 DIAGNOSIS — I10 PRIMARY HYPERTENSION: ICD-10-CM

## 2021-10-04 PROCEDURE — 93000 ELECTROCARDIOGRAM COMPLETE: CPT | Performed by: INTERNAL MEDICINE

## 2021-10-04 PROCEDURE — 99203 OFFICE O/P NEW LOW 30 MIN: CPT | Performed by: INTERNAL MEDICINE

## 2021-10-04 NOTE — PROGRESS NOTES
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  Patient reportedly had A. fib during pregnancy in 2008 and repeat in 2011.  States she had no documented A. fib since 2011.   She has additional history of essential hypertension  She complains of ectopic beats  No syncope      Past Medical History:   Diagnosis Date   • Atrial fibrillation (HCC)     during pregnancies   • Bell palsy 01/21/2016   • Depression      Past Surgical History:   Procedure Laterality Date   • BREAST SURGERY  2018    breast reduction   • FIBULA SOFT TISSUE BIOPSY  1999    cyst removed       Review of Systems   General:  no fatigue and tiredness  Eyes: No redness  Cardiovascular: No chest pain, + for  palpitations        Physical Exam  VITALS REVIEWED    General:      well developed, well nourished, in no acute distress.    Head:      normocephalic and atraumatic.    Eyes:      PERRL/EOM intact, conjunctiva and sclera clear with out nystagmus.    Neck:      no masses, thyromegaly,  trachea central with normal respiratory effort and PMI displaced laterally  Lungs:      clear bilaterally to auscultation.    Heart:Sinus rhythm without any murmurs or gallops              Assessment:    Recent labs revealed normal magnesium, normal potassium 4.1 normal TSH  Palpitations consistent with Pac's--continue smart watch recordings  Fatigue  Hyperglycemia, A1c of 5.8 suggestive of prediabetes  Paroxysmal A. Fib  Dyslipidemia with low HDL  Patient had echocardiogram done 6/29/2021 which revealed normal LV systolic function  Patient reassured        ECG 12 Lead    Date/Time: 10/4/2021 2:19 PM  Performed by: John Crow MD  Authorized by: John Crow MD   Comparison: compared with previous ECG   Similar to previous  ECG  Rhythm: sinus rhythm  Rate: normal  QRS axis: normal            Electronically signed by John Crow MD, 10/04/21, 2:19 PM EDT.

## 2021-10-08 ENCOUNTER — PATIENT ROUNDING (BHMG ONLY) (OUTPATIENT)
Dept: CARDIOLOGY | Facility: CLINIC | Age: 36
End: 2021-10-08

## 2021-10-08 NOTE — PROGRESS NOTES
October 8, 2021    Hello, may I speak with Tania Wolfe?    My name is Allen    I am  with [facility] [address] [place] [address].    Before we get started may I verify your date of birth? 1985    I am calling to officially welcome you to our practice and ask about your recent visit. Is this a good time to talk? No    Tell me about your visit with us. What things went well?  n/a       We're always looking for ways to make our patients' experiences even better. Do you have recommendations on ways we may improve?  n/a    Overall were you satisfied with your first visit to our practice? n/a       I appreciate you taking the time to speak with me today. Is there anything else I can do for you? n/a    Thank you, and have a great day.

## 2021-10-09 ENCOUNTER — HOSPITAL ENCOUNTER (OUTPATIENT)
Facility: HOSPITAL | Age: 36
Setting detail: OBSERVATION
Discharge: HOME | End: 2021-10-12
Attending: HOSPITALIST | Admitting: INTERNAL MEDICINE

## 2021-10-09 ENCOUNTER — APPOINTMENT (OUTPATIENT)
Dept: GENERAL RADIOLOGY | Facility: HOSPITAL | Age: 36
End: 2021-10-09

## 2021-10-09 DIAGNOSIS — R42 LIGHTHEADED: ICD-10-CM

## 2021-10-09 DIAGNOSIS — R07.9 CHEST PAIN, UNSPECIFIED TYPE: Primary | ICD-10-CM

## 2021-10-09 DIAGNOSIS — R06.00 DYSPNEA, UNSPECIFIED TYPE: ICD-10-CM

## 2021-10-09 DIAGNOSIS — I48.91 ATRIAL FIBRILLATION WITH RAPID VENTRICULAR RESPONSE (HCC): ICD-10-CM

## 2021-10-09 LAB
ALBUMIN SERPL-MCNC: 4.5 G/DL (ref 3.5–5.2)
ALBUMIN/GLOB SERPL: 1.5 G/DL
ALP SERPL-CCNC: 96 U/L (ref 39–117)
ALT SERPL W P-5'-P-CCNC: 20 U/L (ref 1–33)
ANION GAP SERPL CALCULATED.3IONS-SCNC: 16 MMOL/L (ref 5–15)
APTT PPP: 28.4 SECONDS (ref 24–31)
AST SERPL-CCNC: 25 U/L (ref 1–32)
BASOPHILS # BLD AUTO: 0 10*3/MM3 (ref 0–0.2)
BASOPHILS NFR BLD AUTO: 0.3 % (ref 0–1.5)
BILIRUB SERPL-MCNC: 0.5 MG/DL (ref 0–1.2)
BUN SERPL-MCNC: 11 MG/DL (ref 6–20)
BUN/CREAT SERPL: 13.4 (ref 7–25)
CALCIUM SPEC-SCNC: 9.2 MG/DL (ref 8.6–10.5)
CHLORIDE SERPL-SCNC: 100 MMOL/L (ref 98–107)
CO2 SERPL-SCNC: 22 MMOL/L (ref 22–29)
CREAT SERPL-MCNC: 0.82 MG/DL (ref 0.57–1)
DEPRECATED RDW RBC AUTO: 38.1 FL (ref 37–54)
EOSINOPHIL # BLD AUTO: 0.3 10*3/MM3 (ref 0–0.4)
EOSINOPHIL NFR BLD AUTO: 2.6 % (ref 0.3–6.2)
ERYTHROCYTE [DISTWIDTH] IN BLOOD BY AUTOMATED COUNT: 13.5 % (ref 12.3–15.4)
GFR SERPL CREATININE-BSD FRML MDRD: 79 ML/MIN/1.73
GLOBULIN UR ELPH-MCNC: 3.1 GM/DL
GLUCOSE SERPL-MCNC: 160 MG/DL (ref 65–99)
HCT VFR BLD AUTO: 42 % (ref 34–46.6)
HGB BLD-MCNC: 14.5 G/DL (ref 12–15.9)
HOLD SPECIMEN: NORMAL
HOLD SPECIMEN: NORMAL
INR PPP: 0.98 (ref 0.93–1.1)
LYMPHOCYTES # BLD AUTO: 3.7 10*3/MM3 (ref 0.7–3.1)
LYMPHOCYTES NFR BLD AUTO: 34 % (ref 19.6–45.3)
MAGNESIUM SERPL-MCNC: 2 MG/DL (ref 1.6–2.6)
MAGNESIUM SERPL-MCNC: 2 MG/DL (ref 1.6–2.6)
MCH RBC QN AUTO: 27.9 PG (ref 26.6–33)
MCHC RBC AUTO-ENTMCNC: 34.5 G/DL (ref 31.5–35.7)
MCV RBC AUTO: 80.8 FL (ref 79–97)
MONOCYTES # BLD AUTO: 0.7 10*3/MM3 (ref 0.1–0.9)
MONOCYTES NFR BLD AUTO: 6.4 % (ref 5–12)
NEUTROPHILS NFR BLD AUTO: 56.7 % (ref 42.7–76)
NEUTROPHILS NFR BLD AUTO: 6.2 10*3/MM3 (ref 1.7–7)
NRBC BLD AUTO-RTO: 0.1 /100 WBC (ref 0–0.2)
NT-PROBNP SERPL-MCNC: 100.1 PG/ML (ref 0–450)
PLATELET # BLD AUTO: 246 10*3/MM3 (ref 140–450)
PMV BLD AUTO: 8.3 FL (ref 6–12)
POTASSIUM SERPL-SCNC: 3.5 MMOL/L (ref 3.5–5.2)
POTASSIUM SERPL-SCNC: 3.8 MMOL/L (ref 3.5–5.2)
PROT SERPL-MCNC: 7.6 G/DL (ref 6–8.5)
PROTHROMBIN TIME: 10.9 SECONDS (ref 9.6–11.7)
RBC # BLD AUTO: 5.2 10*6/MM3 (ref 3.77–5.28)
SODIUM SERPL-SCNC: 138 MMOL/L (ref 136–145)
T4 FREE SERPL-MCNC: 0.99 NG/DL (ref 0.93–1.7)
TROPONIN T SERPL-MCNC: <0.01 NG/ML (ref 0–0.03)
TROPONIN T SERPL-MCNC: <0.01 NG/ML (ref 0–0.03)
TSH SERPL DL<=0.05 MIU/L-ACNC: 4.44 UIU/ML (ref 0.27–4.2)
WBC # BLD AUTO: 11 10*3/MM3 (ref 3.4–10.8)
WHOLE BLOOD HOLD SPECIMEN: NORMAL

## 2021-10-09 PROCEDURE — 84484 ASSAY OF TROPONIN QUANT: CPT | Performed by: NURSE PRACTITIONER

## 2021-10-09 PROCEDURE — 80053 COMPREHEN METABOLIC PANEL: CPT | Performed by: NURSE PRACTITIONER

## 2021-10-09 PROCEDURE — 85025 COMPLETE CBC W/AUTO DIFF WBC: CPT | Performed by: NURSE PRACTITIONER

## 2021-10-09 PROCEDURE — 84443 ASSAY THYROID STIM HORMONE: CPT | Performed by: NURSE PRACTITIONER

## 2021-10-09 PROCEDURE — 85730 THROMBOPLASTIN TIME PARTIAL: CPT | Performed by: NURSE PRACTITIONER

## 2021-10-09 PROCEDURE — 83880 ASSAY OF NATRIURETIC PEPTIDE: CPT | Performed by: NURSE PRACTITIONER

## 2021-10-09 PROCEDURE — 99219 PR INITIAL OBSERVATION CARE/DAY 50 MINUTES: CPT | Performed by: NURSE PRACTITIONER

## 2021-10-09 PROCEDURE — U0003 INFECTIOUS AGENT DETECTION BY NUCLEIC ACID (DNA OR RNA); SEVERE ACUTE RESPIRATORY SYNDROME CORONAVIRUS 2 (SARS-COV-2) (CORONAVIRUS DISEASE [COVID-19]), AMPLIFIED PROBE TECHNIQUE, MAKING USE OF HIGH THROUGHPUT TECHNOLOGIES AS DESCRIBED BY CMS-2020-01-R: HCPCS | Performed by: HOSPITALIST

## 2021-10-09 PROCEDURE — 96376 TX/PRO/DX INJ SAME DRUG ADON: CPT

## 2021-10-09 PROCEDURE — 71045 X-RAY EXAM CHEST 1 VIEW: CPT

## 2021-10-09 PROCEDURE — 93005 ELECTROCARDIOGRAM TRACING: CPT | Performed by: HOSPITALIST

## 2021-10-09 PROCEDURE — 96375 TX/PRO/DX INJ NEW DRUG ADDON: CPT

## 2021-10-09 PROCEDURE — 96365 THER/PROPH/DIAG IV INF INIT: CPT

## 2021-10-09 PROCEDURE — 93005 ELECTROCARDIOGRAM TRACING: CPT

## 2021-10-09 PROCEDURE — 96366 THER/PROPH/DIAG IV INF ADDON: CPT

## 2021-10-09 PROCEDURE — 83735 ASSAY OF MAGNESIUM: CPT | Performed by: NURSE PRACTITIONER

## 2021-10-09 PROCEDURE — C9803 HOPD COVID-19 SPEC COLLECT: HCPCS

## 2021-10-09 PROCEDURE — G0378 HOSPITAL OBSERVATION PER HR: HCPCS

## 2021-10-09 PROCEDURE — 84132 ASSAY OF SERUM POTASSIUM: CPT | Performed by: NURSE PRACTITIONER

## 2021-10-09 PROCEDURE — 85610 PROTHROMBIN TIME: CPT | Performed by: NURSE PRACTITIONER

## 2021-10-09 PROCEDURE — 80061 LIPID PANEL: CPT | Performed by: NURSE PRACTITIONER

## 2021-10-09 PROCEDURE — 93005 ELECTROCARDIOGRAM TRACING: CPT | Performed by: NURSE PRACTITIONER

## 2021-10-09 PROCEDURE — 99285 EMERGENCY DEPT VISIT HI MDM: CPT

## 2021-10-09 PROCEDURE — 84439 ASSAY OF FREE THYROXINE: CPT | Performed by: NURSE PRACTITIONER

## 2021-10-09 RX ORDER — DILTIAZEM HYDROCHLORIDE 5 MG/ML
25 INJECTION INTRAVENOUS ONCE
Status: COMPLETED | OUTPATIENT
Start: 2021-10-09 | End: 2021-10-09

## 2021-10-09 RX ORDER — ONDANSETRON 4 MG/1
4 TABLET, FILM COATED ORAL EVERY 6 HOURS PRN
Status: DISCONTINUED | OUTPATIENT
Start: 2021-10-09 | End: 2021-10-12 | Stop reason: HOSPADM

## 2021-10-09 RX ORDER — ACETAMINOPHEN 650 MG/1
650 SUPPOSITORY RECTAL EVERY 4 HOURS PRN
Status: DISCONTINUED | OUTPATIENT
Start: 2021-10-09 | End: 2021-10-12 | Stop reason: HOSPADM

## 2021-10-09 RX ORDER — CHOLECALCIFEROL (VITAMIN D3) 125 MCG
5 CAPSULE ORAL NIGHTLY PRN
Status: DISCONTINUED | OUTPATIENT
Start: 2021-10-09 | End: 2021-10-12 | Stop reason: HOSPADM

## 2021-10-09 RX ORDER — CALCIUM CARBONATE 200(500)MG
2 TABLET,CHEWABLE ORAL 2 TIMES DAILY PRN
Status: DISCONTINUED | OUTPATIENT
Start: 2021-10-09 | End: 2021-10-12 | Stop reason: HOSPADM

## 2021-10-09 RX ORDER — METOPROLOL TARTRATE 5 MG/5ML
5 INJECTION INTRAVENOUS ONCE
Status: COMPLETED | OUTPATIENT
Start: 2021-10-09 | End: 2021-10-09

## 2021-10-09 RX ORDER — LABETALOL HYDROCHLORIDE 5 MG/ML
20 INJECTION, SOLUTION INTRAVENOUS ONCE
Status: DISCONTINUED | OUTPATIENT
Start: 2021-10-09 | End: 2021-10-09

## 2021-10-09 RX ORDER — DILTIAZEM HYDROCHLORIDE 5 MG/ML
15 INJECTION INTRAVENOUS ONCE
Status: COMPLETED | OUTPATIENT
Start: 2021-10-09 | End: 2021-10-09

## 2021-10-09 RX ORDER — ALUMINA, MAGNESIA, AND SIMETHICONE 2400; 2400; 240 MG/30ML; MG/30ML; MG/30ML
15 SUSPENSION ORAL EVERY 6 HOURS PRN
Status: DISCONTINUED | OUTPATIENT
Start: 2021-10-09 | End: 2021-10-12 | Stop reason: HOSPADM

## 2021-10-09 RX ORDER — MAGNESIUM SULFATE HEPTAHYDRATE 40 MG/ML
2 INJECTION, SOLUTION INTRAVENOUS AS NEEDED
Status: DISCONTINUED | OUTPATIENT
Start: 2021-10-09 | End: 2021-10-12 | Stop reason: HOSPADM

## 2021-10-09 RX ORDER — POTASSIUM CHLORIDE 20 MEQ/1
40 TABLET, EXTENDED RELEASE ORAL AS NEEDED
Status: DISCONTINUED | OUTPATIENT
Start: 2021-10-09 | End: 2021-10-12 | Stop reason: HOSPADM

## 2021-10-09 RX ORDER — POTASSIUM CHLORIDE 1.5 G/1.77G
40 POWDER, FOR SOLUTION ORAL AS NEEDED
Status: DISCONTINUED | OUTPATIENT
Start: 2021-10-09 | End: 2021-10-12 | Stop reason: HOSPADM

## 2021-10-09 RX ORDER — ASPIRIN 81 MG/1
324 TABLET, CHEWABLE ORAL ONCE
Status: COMPLETED | OUTPATIENT
Start: 2021-10-09 | End: 2021-10-09

## 2021-10-09 RX ORDER — NITROGLYCERIN 0.4 MG/1
0.4 TABLET SUBLINGUAL
Status: DISCONTINUED | OUTPATIENT
Start: 2021-10-09 | End: 2021-10-12 | Stop reason: HOSPADM

## 2021-10-09 RX ORDER — ONDANSETRON 2 MG/ML
4 INJECTION INTRAMUSCULAR; INTRAVENOUS EVERY 6 HOURS PRN
Status: DISCONTINUED | OUTPATIENT
Start: 2021-10-09 | End: 2021-10-12 | Stop reason: HOSPADM

## 2021-10-09 RX ORDER — DILTIAZEM HYDROCHLORIDE 5 MG/ML
INJECTION INTRAVENOUS
Status: COMPLETED
Start: 2021-10-09 | End: 2021-10-09

## 2021-10-09 RX ORDER — ACETAMINOPHEN 325 MG/1
650 TABLET ORAL EVERY 4 HOURS PRN
Status: DISCONTINUED | OUTPATIENT
Start: 2021-10-09 | End: 2021-10-12 | Stop reason: HOSPADM

## 2021-10-09 RX ORDER — SODIUM CHLORIDE 0.9 % (FLUSH) 0.9 %
10 SYRINGE (ML) INJECTION AS NEEDED
Status: DISCONTINUED | OUTPATIENT
Start: 2021-10-09 | End: 2021-10-12 | Stop reason: HOSPADM

## 2021-10-09 RX ORDER — ACETAMINOPHEN 160 MG/5ML
650 SOLUTION ORAL EVERY 4 HOURS PRN
Status: DISCONTINUED | OUTPATIENT
Start: 2021-10-09 | End: 2021-10-12 | Stop reason: HOSPADM

## 2021-10-09 RX ORDER — MAGNESIUM SULFATE 1 G/100ML
1 INJECTION INTRAVENOUS AS NEEDED
Status: DISCONTINUED | OUTPATIENT
Start: 2021-10-09 | End: 2021-10-12 | Stop reason: HOSPADM

## 2021-10-09 RX ORDER — METOPROLOL TARTRATE 5 MG/5ML
INJECTION INTRAVENOUS
Status: DISCONTINUED
Start: 2021-10-09 | End: 2021-10-09 | Stop reason: WASHOUT

## 2021-10-09 RX ADMIN — SODIUM CHLORIDE 5 MG/HR: 900 INJECTION, SOLUTION INTRAVENOUS at 21:51

## 2021-10-09 RX ADMIN — SODIUM CHLORIDE, POTASSIUM CHLORIDE, SODIUM LACTATE AND CALCIUM CHLORIDE 1000 ML: 600; 310; 30; 20 INJECTION, SOLUTION INTRAVENOUS at 21:11

## 2021-10-09 RX ADMIN — METOPROLOL TARTRATE 5 MG: 1 INJECTION, SOLUTION INTRAVENOUS at 21:16

## 2021-10-09 RX ADMIN — DILTIAZEM HYDROCHLORIDE 10 MG: 5 INJECTION INTRAVENOUS at 20:29

## 2021-10-09 RX ADMIN — ASPIRIN 81 MG CHEWABLE TABLET 324 MG: 81 TABLET CHEWABLE at 20:34

## 2021-10-09 RX ADMIN — DILTIAZEM HYDROCHLORIDE 15 MG: 5 INJECTION INTRAVENOUS at 21:45

## 2021-10-09 RX ADMIN — METOPROLOL TARTRATE 5 MG: 1 INJECTION, SOLUTION INTRAVENOUS at 20:33

## 2021-10-10 PROBLEM — I48.91 ATRIAL FIBRILLATION WITH RVR: Status: ACTIVE | Noted: 2021-10-10

## 2021-10-10 PROBLEM — J30.2 SEASONAL ALLERGIES: Status: ACTIVE | Noted: 2021-10-10

## 2021-10-10 PROBLEM — R73.9 ELEVATED SERUM GLUCOSE: Status: ACTIVE | Noted: 2021-10-10

## 2021-10-10 LAB
ANION GAP SERPL CALCULATED.3IONS-SCNC: 14 MMOL/L (ref 5–15)
BASOPHILS # BLD AUTO: 0 10*3/MM3 (ref 0–0.2)
BASOPHILS NFR BLD AUTO: 0.4 % (ref 0–1.5)
BUN SERPL-MCNC: 11 MG/DL (ref 6–20)
BUN/CREAT SERPL: 15.5 (ref 7–25)
CALCIUM SPEC-SCNC: 8.7 MG/DL (ref 8.6–10.5)
CHLORIDE SERPL-SCNC: 103 MMOL/L (ref 98–107)
CHOLEST SERPL-MCNC: 188 MG/DL (ref 0–200)
CO2 SERPL-SCNC: 22 MMOL/L (ref 22–29)
CREAT SERPL-MCNC: 0.71 MG/DL (ref 0.57–1)
DEPRECATED RDW RBC AUTO: 38.5 FL (ref 37–54)
EOSINOPHIL # BLD AUTO: 0.3 10*3/MM3 (ref 0–0.4)
EOSINOPHIL NFR BLD AUTO: 2.4 % (ref 0.3–6.2)
ERYTHROCYTE [DISTWIDTH] IN BLOOD BY AUTOMATED COUNT: 13.5 % (ref 12.3–15.4)
GFR SERPL CREATININE-BSD FRML MDRD: 94 ML/MIN/1.73
GLUCOSE SERPL-MCNC: 108 MG/DL (ref 65–99)
HCT VFR BLD AUTO: 38.6 % (ref 34–46.6)
HDLC SERPL-MCNC: 31 MG/DL (ref 40–60)
HGB BLD-MCNC: 13.1 G/DL (ref 12–15.9)
LDLC SERPL CALC-MCNC: 96 MG/DL (ref 0–100)
LDLC/HDLC SERPL: 2.7 {RATIO}
LYMPHOCYTES # BLD AUTO: 4.4 10*3/MM3 (ref 0.7–3.1)
LYMPHOCYTES NFR BLD AUTO: 42.6 % (ref 19.6–45.3)
MCH RBC QN AUTO: 27.5 PG (ref 26.6–33)
MCHC RBC AUTO-ENTMCNC: 34 G/DL (ref 31.5–35.7)
MCV RBC AUTO: 80.8 FL (ref 79–97)
MONOCYTES # BLD AUTO: 0.6 10*3/MM3 (ref 0.1–0.9)
MONOCYTES NFR BLD AUTO: 6.2 % (ref 5–12)
NEUTROPHILS NFR BLD AUTO: 48.4 % (ref 42.7–76)
NEUTROPHILS NFR BLD AUTO: 5 10*3/MM3 (ref 1.7–7)
NRBC BLD AUTO-RTO: 0.1 /100 WBC (ref 0–0.2)
PLATELET # BLD AUTO: 223 10*3/MM3 (ref 140–450)
PMV BLD AUTO: 8.6 FL (ref 6–12)
POTASSIUM SERPL-SCNC: 3.5 MMOL/L (ref 3.5–5.2)
QT INTERVAL: 262 MS
QT INTERVAL: 321 MS
RBC # BLD AUTO: 4.77 10*6/MM3 (ref 3.77–5.28)
SARS-COV-2 RNA PNL SPEC NAA+PROBE: NOT DETECTED
SODIUM SERPL-SCNC: 139 MMOL/L (ref 136–145)
TRIGL SERPL-MCNC: 366 MG/DL (ref 0–150)
VLDLC SERPL-MCNC: 61 MG/DL (ref 5–40)
WBC # BLD AUTO: 10.3 10*3/MM3 (ref 3.4–10.8)

## 2021-10-10 PROCEDURE — 25010000002 ENOXAPARIN PER 10 MG: Performed by: NURSE PRACTITIONER

## 2021-10-10 PROCEDURE — G0378 HOSPITAL OBSERVATION PER HR: HCPCS

## 2021-10-10 PROCEDURE — 93005 ELECTROCARDIOGRAM TRACING: CPT | Performed by: NURSE PRACTITIONER

## 2021-10-10 PROCEDURE — 80048 BASIC METABOLIC PNL TOTAL CA: CPT | Performed by: NURSE PRACTITIONER

## 2021-10-10 PROCEDURE — 36415 COLL VENOUS BLD VENIPUNCTURE: CPT | Performed by: NURSE PRACTITIONER

## 2021-10-10 PROCEDURE — 99226 PR SBSQ OBSERVATION CARE/DAY 35 MINUTES: CPT | Performed by: INTERNAL MEDICINE

## 2021-10-10 PROCEDURE — 25010000002 ENOXAPARIN PER 10 MG: Performed by: INTERNAL MEDICINE

## 2021-10-10 PROCEDURE — 84480 ASSAY TRIIODOTHYRONINE (T3): CPT | Performed by: NURSE PRACTITIONER

## 2021-10-10 PROCEDURE — 96366 THER/PROPH/DIAG IV INF ADDON: CPT

## 2021-10-10 PROCEDURE — 99214 OFFICE O/P EST MOD 30 MIN: CPT | Performed by: INTERNAL MEDICINE

## 2021-10-10 PROCEDURE — 85025 COMPLETE CBC W/AUTO DIFF WBC: CPT | Performed by: NURSE PRACTITIONER

## 2021-10-10 PROCEDURE — 84481 FREE ASSAY (FT-3): CPT | Performed by: NURSE PRACTITIONER

## 2021-10-10 PROCEDURE — 96372 THER/PROPH/DIAG INJ SC/IM: CPT

## 2021-10-10 PROCEDURE — 93005 ELECTROCARDIOGRAM TRACING: CPT | Performed by: INTERNAL MEDICINE

## 2021-10-10 RX ORDER — METOPROLOL TARTRATE 50 MG/1
50 TABLET, FILM COATED ORAL EVERY 8 HOURS SCHEDULED
Status: DISCONTINUED | OUTPATIENT
Start: 2021-10-10 | End: 2021-10-11

## 2021-10-10 RX ORDER — LABETALOL 200 MG/1
100 TABLET, FILM COATED ORAL EVERY 12 HOURS SCHEDULED
Status: DISCONTINUED | OUTPATIENT
Start: 2021-10-10 | End: 2021-10-10

## 2021-10-10 RX ADMIN — LABETALOL HYDROCHLORIDE 100 MG: 200 TABLET, FILM COATED ORAL at 11:40

## 2021-10-10 RX ADMIN — ENOXAPARIN SODIUM 120 MG: 120 INJECTION SUBCUTANEOUS at 02:09

## 2021-10-10 RX ADMIN — ENOXAPARIN SODIUM 110 MG: 120 INJECTION, SOLUTION INTRAVENOUS; SUBCUTANEOUS at 20:17

## 2021-10-10 RX ADMIN — SODIUM CHLORIDE 10 MG/HR: 900 INJECTION, SOLUTION INTRAVENOUS at 06:39

## 2021-10-10 RX ADMIN — METOPROLOL TARTRATE 50 MG: 50 TABLET, FILM COATED ORAL at 15:48

## 2021-10-10 RX ADMIN — METOPROLOL TARTRATE 50 MG: 50 TABLET, FILM COATED ORAL at 20:20

## 2021-10-10 RX ADMIN — SODIUM CHLORIDE 15 MG/HR: 900 INJECTION, SOLUTION INTRAVENOUS at 15:48

## 2021-10-10 RX ADMIN — ENOXAPARIN SODIUM 120 MG: 120 INJECTION SUBCUTANEOUS at 11:40

## 2021-10-10 NOTE — ED PROVIDER NOTES
"Subjective   35-year-old female presents with a 1 hour history of midsternal \"dull\" constant, nonradiating chest pain with lightheadedness dyspnea and nausea.  She reports a history of A. fib with RVR since her pregnancy back in 2011.  She reports that this has been well controlled with Cardizem and labetalol.  She reports that she took her labetalol early tonight due to elevated heart rate.  She denies any leg pain or edema.  Denies history of DVT PE no recent travel.  She reports that she received her Covid vaccine in June 2021 to completion.  Her cardiologist is Dr. Rahman.     1. Location: Midsternal chest  2. Quality: Dull  3. Severity: Moderate  4. Worsening factors: Denies  5. Alleviating factors: Denies  6. Onset: 1 hour  7. Radiation: Denies  8. Frequency: Constant  9. Co-morbidities: Past Medical History:  No date: Atrial fibrillation (HCC)      Comment:  during pregnancies  01/21/2016: Bell palsy  No date: Depression  10. Source: Patient            Review of Systems   Constitutional: Negative for activity change, chills, diaphoresis, fatigue and fever.   Respiratory: Positive for shortness of breath. Negative for cough, chest tightness and wheezing.    Cardiovascular: Positive for chest pain. Negative for palpitations and leg swelling.   Gastrointestinal: Negative for abdominal pain, nausea and vomiting.   Skin: Negative for color change, pallor and rash.   Neurological: Negative for dizziness, syncope, weakness and numbness.   Hematological: Does not bruise/bleed easily.   All other systems reviewed and are negative.      Past Medical History:   Diagnosis Date   • Atrial fibrillation (HCC)     during pregnancies   • Bell palsy 01/21/2016   • Depression        No Known Allergies    Past Surgical History:   Procedure Laterality Date   • BREAST SURGERY  2018    breast reduction   • FIBULA SOFT TISSUE BIOPSY  1999    cyst removed       Family History   Adopted: Yes   Problem Relation Age of Onset   • Heart " disease Mother    • Thyroid disease Father        Social History     Socioeconomic History   • Marital status:    Tobacco Use   • Smoking status: Never Smoker   • Smokeless tobacco: Never Used   Substance and Sexual Activity   • Alcohol use: Yes     Comment: occ   • Drug use: Defer   • Sexual activity: Defer           Objective   Physical Exam  Vitals and nursing note reviewed.   Constitutional:       General: She is awake. She is not in acute distress.     Appearance: Normal appearance. She is well-developed and normal weight.   HENT:      Head: Normocephalic and atraumatic.   Eyes:      Conjunctiva/sclera: Conjunctivae normal.      Pupils: Pupils are equal, round, and reactive to light.   Neck:      Thyroid: No thyromegaly.      Vascular: No JVD.      Trachea: No tracheal deviation.   Cardiovascular:      Rate and Rhythm: Tachycardia present. Rhythm irregular.      Pulses:           Radial pulses are 2+ on the right side and 2+ on the left side.        Dorsalis pedis pulses are 2+ on the right side and 2+ on the left side.        Posterior tibial pulses are 2+ on the right side and 2+ on the left side.      Heart sounds: Normal heart sounds, S1 normal and S2 normal. Heart sounds not distant. No murmur heard.  No friction rub. No gallop.    Pulmonary:      Effort: Pulmonary effort is normal. No respiratory distress.      Breath sounds: Normal breath sounds and air entry. No wheezing or rales.   Chest:      Chest wall: No tenderness.   Abdominal:      General: Bowel sounds are normal. There is no distension.      Palpations: Abdomen is soft. There is no mass.      Tenderness: There is no abdominal tenderness. There is no guarding or rebound.      Hernia: No hernia is present.   Musculoskeletal:         General: Normal range of motion.      Cervical back: Normal range of motion and neck supple.      Right lower leg: No tenderness. No edema.      Left lower leg: No tenderness. No edema.   Skin:     General:  Skin is warm and dry.      Capillary Refill: Capillary refill takes less than 2 seconds.   Neurological:      Mental Status: She is alert and oriented to person, place, and time.      GCS: GCS eye subscore is 4. GCS verbal subscore is 5. GCS motor subscore is 6.   Psychiatric:         Behavior: Behavior normal. Behavior is cooperative.         Thought Content: Thought content normal.         Judgment: Judgment normal.         Procedures                  ED Course  ED Course as of 10/09/21 2240   Sat Oct 09, 2021   2035 ECG 12 Lead  A. fib with RVR rate of 196.  Compared to previous EKG from 3/9/2018 sinus rhythm rate 83.  Interpreted Dr. Sim and reviewed by me. [AL]   2221 ECG 12 Lead  Atrial fibrillation rate of 119.  Interpreted by Dr. Sim and reviewed by me. [AL]      ED Course User Index  [AL] Lamar Daily, APRN    No radiology results for the last day   XR Chest 1 View   ED Interpretation   No acute cardiopulmonary abnormalities.  Interpreted by Dr. Sim and reviewed by me.        Medications   sodium chloride 0.9 % flush 10 mL (has no administration in time range)   dilTIAZem (CARDIZEM) 100 mg in 100 mL NS infusion (ADV) (10 mg/hr Intravenous Rate/Dose Change 10/9/21 2220)   aspirin chewable tablet 324 mg (324 mg Oral Given 10/9/21 2034)   dilTIAZem (CARDIZEM) injection 25 mg (10 mg Intravenous Given 10/9/21 2029)   metoprolol tartrate (LOPRESSOR) injection 5 mg (5 mg Intravenous Given 10/9/21 2033)   lactated ringers bolus 1,000 mL (0 mL Intravenous Stopped 10/9/21 2141)   metoprolol tartrate (LOPRESSOR) injection 5 mg (5 mg Intravenous Given 10/9/21 2116)   dilTIAZem (CARDIZEM) injection 15 mg (15 mg Intravenous Given 10/9/21 2145)     Labs Reviewed   COMPREHENSIVE METABOLIC PANEL - Abnormal; Notable for the following components:       Result Value    Glucose 160 (*)     Anion Gap 16.0 (*)     All other components within normal limits    Narrative:     GFR Normal >60  Chronic Kidney Disease  <60  Kidney Failure <15     CBC WITH AUTO DIFFERENTIAL - Abnormal; Notable for the following components:    WBC 11.00 (*)     Lymphocytes, Absolute 3.70 (*)     All other components within normal limits   TSH - Abnormal; Notable for the following components:    TSH 4.440 (*)     All other components within normal limits   TROPONIN (IN-HOUSE) - Normal    Narrative:     Troponin T Reference Range:  <= 0.03 ng/mL-   Negative for AMI  >0.03 ng/mL-     Abnormal for myocardial necrosis.  Clinicians would have to utilize clinical acumen, EKG, Troponin and serial changes to determine if it is an Acute Myocardial Infarction or myocardial injury due to an underlying chronic condition.       Results may be falsely decreased if patient taking Biotin.     T4, FREE - Normal    Narrative:     Results may be falsely increased if patient taking Biotin.     MAGNESIUM - Normal   RAINBOW DRAW    Narrative:     The following orders were created for panel order [place] Draw.  Procedure                               Abnormality         Status                     ---------                               -----------         ------                     Green Top (Gel)[[id number]]                                  Final result               Lavender Top[[id number]]                                     Final result               Gold Top - SST[[id number]]                                   Final result               Light Blue Top[[id number]]                                   Final result                 Please view results for these tests on the individual orders.   TROPONIN (IN-HOUSE)   CBC AND DIFFERENTIAL    Narrative:     The following orders were created for panel order CBC & Differential.  Procedure                               Abnormality         Status                     ---------                               -----------         ------                     CBC Auto Differential[[id number]]        Abnormal            Final result              "    Please view results for these tests on the individual orders.   GREEN TOP   LAVENDER TOP   GOLD TOP - SST   LIGHT BLUE TOP                                            MDM  Number of Diagnoses or Management Options  Atrial fibrillation with rapid ventricular response (HCC)  Chest pain, unspecified type  Dyspnea, unspecified type  Lightheaded  Diagnosis management comments: Chart Review: 10/4/2021 patient was seen by cardiology for follow-up on A. fib.  Comorbidity: Past Medical History:  No date: Atrial fibrillation (HCC)      Comment:  during pregnancies  01/21/2016: Bell palsy  No date: Depression  Imaging: Was interpreted by physician and reviewed by myself: XR Chest 1 View   ED Interpretation    No acute cardiopulmonary abnormalities.  Interpreted by Dr. Sim and reviewed by me.    Patient undressed and placed in gown for exam.  Appropriate PPE worn during patient exam. 35-year-old female presents with a 1 hour history of midsternal \"dull\" constant, nonradiating chest pain with lightheadedness dyspnea and nausea.  She reports a history of A. fib with RVR since her pregnancy back in 2011.  She reports that this has been well controlled with Cardizem and labetalol.  She reports that she took her labetalol early tonight due to elevated heart rate.  She denies any leg pain or edema.  Denies history of DVT PE no recent travel.  She reports that she received her Covid vaccine in June 2021 to completion.  Her cardiologist is Dr. Rahman.  IV established and labs obtained.  Cardiac work-up initiated.  Patient was given Cardizem 25 mg IV, there was a miscommunication initially patient was only given 10 mg.  Given that patient was nonresponsive to this, her heart rate remained in the 160s, she was given Lopressor 10 mg IV.  When discovering that patient had not been given the entire bolus, she was given the remainder Cardizem 15 mg IV and started on a Cardizem drip.  Patient converted to a controlled ventricular " rate between the 90s and 1 teens.  Patient did have 1 episode of increase of heart rate to the 130s in which her drip was increased to 10 mg/h.  Patient will be admitted for cardiac work-up and cardiology consult.  Patient was seen by her cardiologist this past Monday.  Spoke with JOHANNE Daniel who accepted admission on behalf of Dr. Nelson.     Disposition/Treatment: Discussed results with patient, verbalized understanding.  Agreeable with plan of care.  Patient was stable upon admission.       Part of this note may be an electronic transcription/translation of spoken language to printed text using the Dragon Dictation System.            Amount and/or Complexity of Data Reviewed  Clinical lab tests: reviewed    Risk of Complications, Morbidity, and/or Mortality  General comments: This case was discussed with my attending, Dr. Sim who also evaluated patient.    Patient Progress  Patient progress: stable      Final diagnoses:   Chest pain, unspecified type   Dyspnea, unspecified type   Atrial fibrillation with rapid ventricular response (HCC)   Lightheaded       ED Disposition  ED Disposition     ED Disposition Condition Comment    Decision to Admit  Level of Care: Telemetry [5]   Diagnosis: Atrial fibrillation with RVR (HCC) [219594]   Admitting Physician: ASHLEY NELSON [[id number]]   Attending Physician: ASHLEY NELSON [[id number]]   Bed Request Comments: JORGE            No follow-up provider specified.       Medication List      No changes were made to your prescriptions during this visit.          Lamar Daily, APRN  10/09/21 1922

## 2021-10-10 NOTE — PLAN OF CARE
Goal Outcome Evaluation:  Plan of Care Reviewed With: patient        Progress: improving  Outcome Summary: Pt. has no c/o CP or SOA overnight once admitted to PCU, cardizem gtt continued with HR  depending on activity, all other VSS, cardiology consulted and pt. kept NPO for possible cardiac intervention today, no acute changes, will continue to monitor

## 2021-10-10 NOTE — H&P
[facility] Medicine Services      Patient Name: Tania Wolfe  : 1985  MRN: [id number]  Primary Care Physician:  Madison Cruz MD  Date of admission: 10/9/2021      Subjective      Chief Complaint: Palpitations    History of Present Illness: Tania Wolfe is a 35 y.o. female who presented to [facility] on 10/9/2021 complaining of palpitations, chest pain, diaphoresis, nausea that started suddenly today.  Vagal maneuvers at home were not successful.  Patient has a history of A. fib with RVR ever since her pregnancy in .  She stated her heart rate is usually well controlled with medications, with a few sporadic incidences of tachycardia up until this point.  Patient saw Dr. Crow (Cardiology) on 10/4/2021 regarding her apple watch showed occasional tachycardic events-EKG personally reviewed by Dr. Crow showed PVC's and stated may have to have a cardiac ablation if tachycardia persists.  Patient described midsternal chest pain as dull and worse with exertion.  Chest pain, diaphoresis, and shortness of air have resolved at this time.  Patient stated she has lowered her home Lexapro down from 20mg to 10 mg.  Patient has had elevated TSH and normal T3 and T4 results in the past.  Patient stated her father had thyroid dysfunction.    In the ED, chest x-ray pending.  EKG showed A. fib with rate of 119.  All labs unremarkable except TSH 4.44, WBC 11, glucose 160.  Vital signs unremarkable except heart rate 205 on arrival to the ED.  Patient given aspirin, Cardizem bolus and drip, IV fluid bolus, metoprolol in the ED.  Patient admitted to hospitalist group for further evaluation and treatment.    Review of Systems   Constitutional: Negative. Negative for chills and fever.   HENT: Negative.    Eyes: Negative.    Cardiovascular: Positive for dyspnea on exertion and palpitations. Negative for chest pain.   Respiratory: Negative for cough and shortness of breath.     Endocrine: Negative.    Skin: Negative.    Musculoskeletal: Negative.    Gastrointestinal: Positive for nausea. Negative for vomiting.   Genitourinary: Negative.    Neurological: Negative.    Psychiatric/Behavioral: Negative.    Allergic/Immunologic: Negative.         Personal History     Past Medical History:   Diagnosis Date   • Atrial fibrillation (HCC)     during pregnancies   • Bell palsy 01/21/2016   • Depression        Past Surgical History:   Procedure Laterality Date   • BREAST SURGERY  2018    breast reduction   • FIBULA SOFT TISSUE BIOPSY  1999    cyst removed       Family History: family history includes Heart disease in her mother; Thyroid disease in her father. She was adopted. Otherwise pertinent FHx was reviewed and not pertinent to current issue.    Social History:  reports that she has never smoked. She has never used smokeless tobacco. She reports current alcohol use. Drug use questions deferred to the physician.    Home Medications:  Prior to Admission Medications     Prescriptions Last Dose Informant Patient Reported? Taking?    cetirizine (zyrTEC) 10 MG tablet   Yes No    Take 10 mg by mouth Daily.    dilTIAZem CD (CARDIZEM CD) 120 MG 24 hr capsule   No No    Take 1 capsule by mouth Daily.    escitalopram (Lexapro) 20 MG tablet   No No    Take 1 tablet by mouth Daily.    labetalol (NORMODYNE) 100 MG tablet   No No    Take 1 tablet by mouth 2 (Two) Times a Day.    labetalol (NORMODYNE) 100 MG tablet   No No    Take 1 tablet by mouth 2 (two) times a day.    Multiple Vitamin (MULTIVITAMIN) capsule   Yes No    MULTIVITAMINS CAPS    Rhubarb (ESTROVEN MENOPAUSE RELIEF PO)   Yes No            Allergies:  No Known Allergies    Objective      Vitals:   Heart Rate:  [] 96  Resp:  [20-30] 20  BP: (107-141)/(54-97) 118/61    Physical Exam  Vitals and nursing note reviewed.   Constitutional:       Appearance: Normal appearance.   HENT:      Head: Normocephalic.      Nose: Nose normal.       Mouth/Throat:      Pharynx: Oropharynx is clear.   Eyes:      Extraocular Movements: Extraocular movements intact.   Cardiovascular:      Rate and Rhythm: Tachycardia present. Rhythm irregular.      Pulses: Normal pulses.      Heart sounds: Normal heart sounds.   Pulmonary:      Effort: Pulmonary effort is normal.      Breath sounds: Normal breath sounds.   Abdominal:      General: Bowel sounds are normal.      Palpations: Abdomen is soft.   Musculoskeletal:         General: Normal range of motion.      Cervical back: Normal range of motion.      Right lower leg: Edema present.      Left lower leg: Edema present.   Skin:     General: Skin is warm and dry.   Neurological:      Mental Status: She is alert and oriented to person, place, and time.   Psychiatric:         Mood and Affect: Mood normal.         Behavior: Behavior normal.        Result Review    Result Review:  I have personally reviewed the results from the time of this admission to 10/10/2021 01:44 EDT and agree with these findings:  [x]  Laboratory  []  Microbiology  []  Radiology  [x]  EKG/Telemetry   []  Cardiology/Vascular   []  Pathology  []  Old records  []  Other:  Most notable findings include: As above    Assessment/Plan        Active Hospital Problems:  Active Hospital Problems    Diagnosis    • **Paroxysmal atrial fibrillation (HCC)    • Seasonal allergies    • Elevated serum glucose    • Anxiety    • Depression      Plan:     -----Med rec not completed-----    Paroxysmal atrial fibrillation   -A. fib with RVR on arrival to the ED, heart rate 205  -EKG reviewed  -Continuous cardiac monitoring  -Cardiology consulted  -Cardizem bolus and drip started in the ED, continue Cardizem drip  -Echo on 6/29/2021 showed EF 60 to 65%  -PT/INR and PTT ordered  -TSH 4.44  -Free T4 WNL (0.99)  -T3 ordered  -Serial troponins ordered  -Lipid panel, BNP ordered  -Hemoglobin A1c 5.8 on 6/3/2021  -EKG in the morning  -N.p.o. after midnight  -Patient takes Cardizem  and labetalol at home, hold while on Cardizem drip    Elevated serum glucose  -Glucose 160  -Continue to monitor    Depression  Anxiety  -Stable  -Continue home Lexapro    Seasonal allergies  -Continue home Zyrtec    DVT prophylaxis:  Medical DVT prophylaxis orders are present.    CODE STATUS:    Code Status: CPR  Medical Interventions (Level of Support Prior to Arrest): Full    Admission Status:  I believe this patient meets observation status.    I discussed the patient's findings and my recommendations with patient.    This patient has been examined wearing appropriate Personal Protective Equipment. 10/10/21      Signature: Electronically signed by KIMBERLY Aaron, 10/10/21, 1:44 AM EDT.

## 2021-10-10 NOTE — ED NOTES
Pt denies chest pain at this time. Pt reported lightheadedness 15 minutes ago. Pt was laid back on stretcher. Pt reported lightheadedness improved. Provider notified of patient's symptoms. Will continue to monitor.     Rufina Maxwell RN  10/09/21 8210

## 2021-10-10 NOTE — PROGRESS NOTES
[facility] Medicine Services Daily Progress Note    Patient Name: Tania Wolfe  : 1985  MRN: [id number]  Primary Care Physician:  Madison Cruz MD  Date of admission: 10/9/2021      Subjective      Chief Complaint: Palpitations and chest pain.      Patient Reports no overnight events.    Review of Systems   Constitutional: Negative.   HENT: Negative.    Eyes: Negative.    Cardiovascular: Negative.    Respiratory: Negative.    Endocrine: Negative.    Hematologic/Lymphatic: Negative.    Skin: Negative.    Musculoskeletal: Negative.    Gastrointestinal: Negative.    Genitourinary: Negative.    Neurological: Negative.    Psychiatric/Behavioral: Negative.    Allergic/Immunologic: Negative.             Objective      Vitals:   Temp:  [98 °F (36.7 °C)-98.4 °F (36.9 °C)] 98 °F (36.7 °C)  Heart Rate:  [] 104  Resp:  [18-30] 20  BP: (105-141)/(49-97) 126/56    Physical Exam  Vitals and nursing note reviewed.   Constitutional:       General: She is not in acute distress.  HENT:      Head: Normocephalic and atraumatic.      Nose: Nose normal. No congestion or rhinorrhea.      Mouth/Throat:      Mouth: Mucous membranes are moist.      Pharynx: Oropharynx is clear. No oropharyngeal exudate or posterior oropharyngeal erythema.   Eyes:      Pupils: Pupils are equal, round, and reactive to light.   Cardiovascular:      Pulses: Normal pulses.      Heart sounds: Normal heart sounds. No murmur heard.  No friction rub. No gallop.       Comments: S1 and S2 present.  Positive tachycardia.  Irregularly irregular.  Pulmonary:      Effort: No respiratory distress.      Breath sounds: No wheezing, rhonchi or rales.   Chest:      Chest wall: No tenderness.   Abdominal:      General: Abdomen is flat. Bowel sounds are normal. There is no distension.      Palpations: Abdomen is soft.      Tenderness: There is no right CVA tenderness.   Musculoskeletal:         General: No swelling, tenderness,  deformity or signs of injury.      Cervical back: Neck supple. No tenderness.      Right lower leg: No edema.      Left lower leg: No edema.   Skin:     Capillary Refill: Capillary refill takes less than 2 seconds.      Coloration: Skin is not jaundiced.      Findings: No bruising, lesion or rash.   Neurological:      Mental Status: She is alert.      Comments: No facial asymmetry noted.   Psychiatric:      Comments: No agitation.                 Result Review    Result Review:  I have personally reviewed the results from the time of this admission to 10/10/2021 16:30 EDT and agree with these findings:  [x]  Laboratory  [x]  Microbiology  [x]  Radiology  []  EKG/Telemetry   []  Cardiology/Vascular   []  Pathology  []  Old records  []  Other:  Most notable findings include:           Assessment/Plan      Brief Patient Summary:  Patient is a 35-year-old female with past medical history of anxiety disorder, depression, Bell's palsy, paroxysmal atrial fibrillation who presented to the emergency room because of palpitations.  Patient also complained of chest pain, nausea and diaphoresis.  It was noted that the patient first episode of A. fib with RVR was with pregnancy of 2009.  Patient was seen by  in the clinic on 8 10/4/2021.    Cardiology consult was completed.      enoxaparin, 110 mg, Subcutaneous, Q12H  metoprolol tartrate, 50 mg, Oral, Q8H       dilTIAZem, 5-15 mg/hr, Last Rate: 15 mg/hr (10/10/21 3394)  Pharmacy to Dose enoxaparin (LOVENOX),          Active Hospital Problems:  Active Hospital Problems    Diagnosis    • **Atrial fibrillation with RVR (HCC)  Treat with diltiazem gtt.  Follow cardiology recommendations.      • Seasonal allergies    • Anxiety    • Depression    • Bell's palsy    • Paroxysmal atrial fibrillation (HCC)        Resume appropriate patient's home medications for other chronic medical conditions.  Continue the present level of care.  Patient and family agreed with the plan of  care.      DVT prophylaxis:  Medical DVT prophylaxis orders are present.    CODE STATUS:    Code Status: CPR  Medical Interventions (Level of Support Prior to Arrest): Full      Disposition: Pending patient's clinical improvement.    This patient has been examined wearing appropriate Personal Protective Equipment and discussed with hospital infection control department, [facility] department, infectious disease specialist and pulmonologist. 10/10/21      Electronically signed by Chucky Rosales MD, FACP, 10/10/21, 16:30 EDT.      [facility], operated by [facility] Hospitalist Team

## 2021-10-10 NOTE — CONSULTS
Cardiology Consult Note    Evaristo Bolden MD, PhD  REQUESTING PHYSICIAN    Chucky Rosales MD    PATIENT IDENTIFICATION  Name: Tania Wolfe  Age: 35 y.o.  Sex: female  :  1985  MRN: [id number]             REASON FOR CONSULTATION:  35-year-old female with no known history of premature coronary disease or structural heart disease.  Patient does have known history of paroxysmal atrial fibrillation.      CC:        HISTORY OF PRESENT ILLNESS:   Patient presented to [facility] 10/9/2021 with complaint of palpitations and concern for recurrence of atrial fibrillation.  She states she first experienced A. fib with her first pregnancy and has had episodes with each pregnancy.  Last known episode was .  Patient reports over the past year she has noted fleeting episodes of palpitations lasting for just a few seconds.  She has an iWatch with telemetry capability and has noted irregular rhythm.  She has been evaluated by Dr. Crow on 10/4/2021.  She also reports some midsternal chest pressure that is worse with exertion and improves with rest.  She becomes diaphoretic and feels short of breath with episodes of increased heart rate.  Her iWatch recorded heart rate 200-210.  She was symptomatic and tried vagal maneuvers which did not improve the rate.  She became concerned so she presented to the ER.  In the ER, initial heart rate 205.  EKG showed atrial fibrillation with rate of 119.  She was given aspirin, IV Cardizem bolus and started on drip which is currently at 10 mg.  She was also given IV fluids.    Upon my evaluation, she is sitting up in bed and appears to be resting comfortably.  Heart rate is currently 107.  Patient states when she gets up to the bathroom it does increase to 120.  She denies any recent presyncopal or syncopal episodes, no dizziness or lightheadedness.  She has had no lower extremity edema. Rate and rhythm control strategy was discussed along with efforts to  "return to sinus rhythm ultimately. She will need EP consult for further possibility for ablation versus cardioversion if refractory. She is very young and would not recommend long-term antiarrhythmics. This was discussed with the patient extensively    Review of systems negative x14 point review of systems except was mentioned above    Historical data copied forward from previous encounters in EMR is unchanged          REVIEW OF SYSTEMS:  Pertinent items are noted in HPI, all other systems reviewed and negative    OBJECTIVE   Troponin negative  Triglycerides 366  proBNP 100.1  TSH 4.44/Free T4 0.99  Magnesium 2    ASSESSMENT  Atrial fibrillation with rapid ventricular response  History of A. fib with pregnancy  History of Bell's palsy  History of depression  Obesity    PLAN  Continue IV diltiazem. Titrate for rate less than 110 at rest  Restart home dose beta-blocker  Recent echo with normal LV function, no valvular dysfunction  Patient has had no prior ischemic work-up.  Will make n.p.o. at midnight for any additional testing in a.m.  Continue to monitor rhythm closely  Discussed with patient and attending nurse  Patient's primary cardiologist-Dr. Rahman-to resume care in a.m. for further determination rate and rhythm control strategy      Vital Signs  Visit Vitals  /49 (BP Location: Right arm, Patient Position: Lying)   Pulse 97   Temp 98.1 °F (36.7 °C) (Oral)   Resp 18   Ht 177.8 cm (70\")   Wt 112 kg (247 lb 5.7 oz)   SpO2 100%   BMI 35.49 kg/m²     Oxygen Therapy  SpO2: 100 %  Pulse Oximetry Type: Continuous  Device (Oxygen Therapy): room air  Flowsheet Rows        First Filed Value   Admission Height 177.8 cm (70\") Documented at 10/09/2021 2019   Admission Weight 120 kg (264 lb 8.8 oz) Documented at 10/09/2021 2019          Intake & Output (last 3 days)         10/07 0701  10/08 0700 10/08 0701  10/09 0700 10/09 0701  10/10 0700 10/10 0701  10/11 0700    I.V. (mL/kg)   39.1 (0.3) 80 (0.7)    IV " "Piggyback   1000     Total Intake(mL/kg)   1039.1 (9.3) 80 (0.7)    Net   +1039.1 +80                  Lines, Drains & Airways       Active LDAs       Name Placement date Placement time Site Days    Peripheral IV 10/09/21 2024 Left Antecubital 10/09/21  2024  Antecubital  less than 1    Peripheral IV 10/09/21 2024 Right Forearm 10/09/21  2024  Forearm  less than 1                    MEDICAL HISTORY    Past Medical History:   Diagnosis Date    Atrial fibrillation (HCC)     during pregnancies    Bell palsy 01/21/2016    Depression         SURGICAL HISTORY    Past Surgical History:   Procedure Laterality Date    BREAST SURGERY  2018    breast reduction    FIBULA SOFT TISSUE BIOPSY  1999    cyst removed        FAMILY HISTORY    Family History   Adopted: Yes   Problem Relation Age of Onset    Heart disease Mother     Thyroid disease Father        SOCIAL HISTORY    Social History     Tobacco Use    Smoking status: Never Smoker    Smokeless tobacco: Never Used   Substance Use Topics    Alcohol use: Yes     Comment: occ        ALLERGIES    No Known Allergies           /49 (BP Location: Right arm, Patient Position: Lying)   Pulse 97   Temp 98.1 °F (36.7 °C) (Oral)   Resp 18   Ht 177.8 cm (70\")   Wt 112 kg (247 lb 5.7 oz)   SpO2 100%   BMI 35.49 kg/m²   Intake/Output last 3 shifts:  I/O last 3 completed shifts:  In: 1039.1 [I.V.:39.1; IV Piggyback:1000]  Out: -   Intake/Output this shift:  I/O this shift:  In: 80 [I.V.:80]  Out: -     PHYSICAL EXAM:    General: Well-developed, well-nourished 35-year-old female who is alert, cooperative, no distress, appears stated age  Head:  Normocephalic, atraumatic, mucous membranes moist  Eyes:  Conjunctiva/corneas clear, EOM's intact     Neck:  Supple,  no adenopathy; no JVD or bruit  Lungs: Clear to auscultation bilaterally, no wheezes rhonchi rales are noted  Chest wall: No tenderness  Heart::  Irregularly irregular rate and rhythm, S1 and S2 normal, no murmur, rub or " gallop  Abdomen: Soft, non-tender, nondistended bowel sounds active  Extremities: No cyanosis, clubbing, or edema   Pulses: 2+ and symmetric all extremities  Skin:  No rashes or lesions  Neuro/psych: A&O x3. CN II through XII are grossly intact with appropriate affect      Scheduled Meds:      enoxaparin, 1 mg/kg, Subcutaneous, Q12H        Continuous Infusions:    dilTIAZem, 5-15 mg/hr, Last Rate: 10 mg/hr (10/10/21 1000)  Pharmacy to Dose enoxaparin (LOVENOX),         PRN Meds:      acetaminophen **OR** acetaminophen **OR** acetaminophen    aluminum-magnesium hydroxide-simethicone    calcium carbonate    magnesium sulfate **OR** magnesium sulfate in D5W 1g/100mL (PREMIX)    melatonin    nitroglycerin    ondansetron **OR** ondansetron    Pharmacy to Dose enoxaparin (LOVENOX)    potassium chloride    potassium chloride    sodium chloride        Results Review:     I reviewed the patient's new clinical results.    CBC    Results from last 7 days   Lab Units 10/10/21  0428 10/09/21  2038   WBC 10*3/mm3 10.30 11.00*   HEMOGLOBIN g/dL 13.1 14.5   PLATELETS 10*3/mm3 223 246     Cr Clearance Estimated Creatinine Clearance: 150 mL/min (by C-G formula based on SCr of 0.71 mg/dL).  Coag   Results from last 7 days   Lab Units 10/09/21  2038   INR  0.98   APTT seconds 28.4     HbA1C   Lab Results   Component Value Date    HGBA1C 5.8 (H) 06/03/2021     Blood Glucose No results found for: POCGLU  Infection     CMP   Results from last 7 days   Lab Units 10/10/21  0428 10/09/21  2231 10/09/21  2038   SODIUM mmol/L 139  --  138   POTASSIUM mmol/L 3.5 3.5 3.8   CHLORIDE mmol/L 103  --  100   CO2 mmol/L 22.0  --  22.0   BUN mg/dL 11  --  11   CREATININE mg/dL 0.71  --  0.82   GLUCOSE mg/dL 108*  --  160*   ALBUMIN g/dL  --   --  4.50   BILIRUBIN mg/dL  --   --  0.5   ALK PHOS U/L  --   --  96   AST (SGOT) U/L  --   --  25   ALT (SGPT) U/L  --   --  20     ABG      UA      JOSELO  No results found for: POCMETH, POCAMPHET, POCBARBITUR,  POCBENZO, POCCOCAINE, POCOPIATES, POCOXYCODO, POCPHENCYC, POCPROPOXY, POCTHC, POCTRICYC  Lysis Labs   Results from last 7 days   Lab Units 10/10/21  0428 10/09/21  2038   INR   --  0.98   APTT seconds  --  28.4   HEMOGLOBIN g/dL 13.1 14.5   PLATELETS 10*3/mm3 223 246   CREATININE mg/dL 0.71 0.82     Radiology(recent) XR Chest 1 View    Result Date: 10/10/2021  No acute cardiopulmonary process identified.  Electronically Signed By-Orestes Dotson MD On:10/10/2021 8:34 AM This report was finalized on [id number] by  Orestes Dotson MD.        Results from last 7 days   Lab Units 10/09/21  2231   TROPONIN T ng/mL <0.010       Xrays, labs reviewed personally by physician.    ECG/EMG Results (most recent)       Procedure Component Value Units Date/Time    ECG 12 Lead [[id number]] Collected: 10/10/21 0521     Updated: 10/10/21 0523     QT Interval 380 ms     Narrative:      HEART RATE= 97  bpm  RR Interval= 620  ms  IN Interval=   ms  P Horizontal Axis=   deg  P Front Axis=   deg  QRSD Interval= 79  ms  QT Interval= 380  ms  QRS Axis= 75  deg  T Wave Axis= 36  deg  - ABNORMAL ECG -  Atrial fibrillation  Low voltage, precordial leads  When compared with ECG of 09-Oct-2021 22:21:16,  No significant change  Electronically Signed By:   Date and Time of Study: 2021-10-10 05:21:29    ECG 12 Lead [[id number]] Collected: 10/09/21 2020     Updated: 10/10/21 0956     QT Interval 262 ms     Narrative:      HEART RATE= 196  bpm  RR Interval= 304  ms  IN Interval= 76  ms  P Horizontal Axis= -66  deg  P Front Axis= 175  deg  QRSD Interval= 75  ms  QT Interval= 262  ms  QRS Axis= 73  deg  T Wave Axis= -19  deg  - ABNORMAL ECG -  A-Fib with RVR  Repolarization abnormality, prob rate related  When compared with ECG of 09-Mar-2018 12:00:43,  Significant change in rhythm: previously sinus  Electronically Signed By: Rigoberto Sim (ALEJANDRA) 10-Oct-2021 09:56:11  Date and Time of Study: 2021-10-09 20:20:44    ECG 12 Lead [[id number]] Collected:  10/09/21 2221     Updated: 10/10/21 0957     QT Interval 321 ms     Narrative:      HEART RATE= 119  bpm  RR Interval= 491  ms  KS Interval=   ms  P Horizontal Axis=   deg  P Front Axis=   deg  QRSD Interval= 75  ms  QT Interval= 321  ms  QRS Axis= 73  deg  T Wave Axis= 33  deg  - ABNORMAL ECG -  Atrial fib   Low voltage, precordial leads  When compared with ECG of 09-Mar-2018 12:00:43,  Significant change in rhythm: previously sinus  Significant axis, voltage or hypertrophy change  Electronically Signed By: Rigoberto Sim (ALEJANDRA) 10-Oct-2021 09:56:58  Date and Time of Study: 2021-10-09 22:21:16              Medication Review:   I have reviewed the patient's current medication list  Scheduled Meds:enoxaparin, 1 mg/kg, Subcutaneous, Q12H      Continuous Infusions:dilTIAZem, 5-15 mg/hr, Last Rate: 10 mg/hr (10/10/21 1000)  Pharmacy to Dose enoxaparin (LOVENOX),       PRN Meds:.  acetaminophen **OR** acetaminophen **OR** acetaminophen    aluminum-magnesium hydroxide-simethicone    calcium carbonate    magnesium sulfate **OR** magnesium sulfate in D5W 1g/100mL (PREMIX)    melatonin    nitroglycerin    ondansetron **OR** ondansetron    Pharmacy to Dose enoxaparin (LOVENOX)    potassium chloride    potassium chloride    sodium chloride    Imaging:  Imaging Results (Last 72 Hours)       Procedure Component Value Units Date/Time    XR Chest 1 View [[id number]] Collected: 10/10/21 0833     Updated: 10/10/21 0836    Narrative:      XR CHEST 1 VW-     Date of Exam: 10/9/2021 8:45 PM     Indication: Chest pain protocol.     Comparison Exams: None available.     Technique: Single AP chest radiograph     FINDINGS:  The lungs are clear. The heart and mediastinal contours appear normal.  The pulmonary vasculature appears normal. The osseous structures appear  intact.       Impression:      No acute cardiopulmonary process identified.     Electronically Signed By-Orestes Dotson MD On:10/10/2021 8:34 AM  This report was finalized on  "[id number] by  Orestes Dotson MD.              KIMBERLY Hugo  10/10/21  11:21 EDT       EMR Dragon/Transcription:   \"Dictated utilizing Dragon dictation\".     Evaristo Bolden MD, PhD            Electronically signed by KIMBERLY Hugo, 10/10/21, 11:21 AM EDT.    "

## 2021-10-11 ENCOUNTER — APPOINTMENT (OUTPATIENT)
Dept: NUCLEAR MEDICINE | Facility: HOSPITAL | Age: 36
End: 2021-10-11

## 2021-10-11 PROBLEM — R07.9 CHEST PAIN: Status: ACTIVE | Noted: 2021-10-11

## 2021-10-11 LAB
ANION GAP SERPL CALCULATED.3IONS-SCNC: 14 MMOL/L (ref 5–15)
BASOPHILS # BLD AUTO: 0 10*3/MM3 (ref 0–0.2)
BASOPHILS NFR BLD AUTO: 0.3 % (ref 0–1.5)
BH CV REST NUCLEAR ISOTOPE DOSE: 7.7 MCI
BH CV STRESS BP STAGE 1: NORMAL
BH CV STRESS BP STAGE 2: NORMAL
BH CV STRESS BP STAGE 3: NORMAL
BH CV STRESS BP STAGE 4: NORMAL
BH CV STRESS COMMENTS STAGE 1: NORMAL
BH CV STRESS DOSE REGADENOSON STAGE 1: 0.4
BH CV STRESS DURATION MIN STAGE 1: 1
BH CV STRESS DURATION MIN STAGE 2: 1
BH CV STRESS DURATION MIN STAGE 3: 1
BH CV STRESS DURATION MIN STAGE 4: 1
BH CV STRESS DURATION SEC STAGE 2: 0
BH CV STRESS HR STAGE 1: 81
BH CV STRESS HR STAGE 2: 71
BH CV STRESS HR STAGE 3: 109
BH CV STRESS HR STAGE 4: 106
BH CV STRESS NUCLEAR ISOTOPE DOSE: 21.9 MCI
BH CV STRESS PROTOCOL 1: NORMAL
BH CV STRESS RECOVERY BP: NORMAL MMHG
BH CV STRESS RECOVERY HR: 90 BPM
BH CV STRESS STAGE 1: 1
BH CV STRESS STAGE 2: 2
BH CV STRESS STAGE 3: 3
BH CV STRESS STAGE 4: 4
BUN SERPL-MCNC: 10 MG/DL (ref 6–20)
BUN/CREAT SERPL: 13.5 (ref 7–25)
CALCIUM SPEC-SCNC: 8.8 MG/DL (ref 8.6–10.5)
CHLORIDE SERPL-SCNC: 104 MMOL/L (ref 98–107)
CO2 SERPL-SCNC: 20 MMOL/L (ref 22–29)
CREAT SERPL-MCNC: 0.74 MG/DL (ref 0.57–1)
DEPRECATED RDW RBC AUTO: 37.6 FL (ref 37–54)
EOSINOPHIL # BLD AUTO: 0.3 10*3/MM3 (ref 0–0.4)
EOSINOPHIL NFR BLD AUTO: 2.4 % (ref 0.3–6.2)
ERYTHROCYTE [DISTWIDTH] IN BLOOD BY AUTOMATED COUNT: 13.3 % (ref 12.3–15.4)
GFR SERPL CREATININE-BSD FRML MDRD: 89 ML/MIN/1.73
GLUCOSE SERPL-MCNC: 124 MG/DL (ref 65–99)
HCT VFR BLD AUTO: 36.9 % (ref 34–46.6)
HGB BLD-MCNC: 12.6 G/DL (ref 12–15.9)
LYMPHOCYTES # BLD AUTO: 4.4 10*3/MM3 (ref 0.7–3.1)
LYMPHOCYTES NFR BLD AUTO: 42 % (ref 19.6–45.3)
MAGNESIUM SERPL-MCNC: 2 MG/DL (ref 1.6–2.6)
MAXIMAL PREDICTED HEART RATE: 185 BPM
MCH RBC QN AUTO: 27.4 PG (ref 26.6–33)
MCHC RBC AUTO-ENTMCNC: 34.2 G/DL (ref 31.5–35.7)
MCV RBC AUTO: 80.2 FL (ref 79–97)
MONOCYTES # BLD AUTO: 0.7 10*3/MM3 (ref 0.1–0.9)
MONOCYTES NFR BLD AUTO: 6.3 % (ref 5–12)
NEUTROPHILS NFR BLD AUTO: 49 % (ref 42.7–76)
NEUTROPHILS NFR BLD AUTO: 5.1 10*3/MM3 (ref 1.7–7)
NRBC BLD AUTO-RTO: 0.1 /100 WBC (ref 0–0.2)
PERCENT MAX PREDICTED HR: 60.54 %
PLATELET # BLD AUTO: 218 10*3/MM3 (ref 140–450)
PMV BLD AUTO: 8 FL (ref 6–12)
POTASSIUM SERPL-SCNC: 3.7 MMOL/L (ref 3.5–5.2)
RBC # BLD AUTO: 4.6 10*6/MM3 (ref 3.77–5.28)
SODIUM SERPL-SCNC: 138 MMOL/L (ref 136–145)
STRESS BASELINE BP: NORMAL MMHG
STRESS BASELINE HR: 81 BPM
STRESS PERCENT HR: 71 %
STRESS POST PEAK BP: NORMAL MMHG
STRESS POST PEAK HR: 112 BPM
STRESS TARGET HR: 157 BPM
WBC # BLD AUTO: 10.5 10*3/MM3 (ref 3.4–10.8)

## 2021-10-11 PROCEDURE — 80048 BASIC METABOLIC PNL TOTAL CA: CPT | Performed by: NURSE PRACTITIONER

## 2021-10-11 PROCEDURE — G0378 HOSPITAL OBSERVATION PER HR: HCPCS

## 2021-10-11 PROCEDURE — 99214 OFFICE O/P EST MOD 30 MIN: CPT | Performed by: INTERNAL MEDICINE

## 2021-10-11 PROCEDURE — 93017 CV STRESS TEST TRACING ONLY: CPT

## 2021-10-11 PROCEDURE — 85025 COMPLETE CBC W/AUTO DIFF WBC: CPT | Performed by: NURSE PRACTITIONER

## 2021-10-11 PROCEDURE — 25010000002 ENOXAPARIN PER 10 MG: Performed by: INTERNAL MEDICINE

## 2021-10-11 PROCEDURE — 96372 THER/PROPH/DIAG INJ SC/IM: CPT

## 2021-10-11 PROCEDURE — 83735 ASSAY OF MAGNESIUM: CPT | Performed by: NURSE PRACTITIONER

## 2021-10-11 PROCEDURE — 96366 THER/PROPH/DIAG IV INF ADDON: CPT

## 2021-10-11 PROCEDURE — 78452 HT MUSCLE IMAGE SPECT MULT: CPT | Performed by: INTERNAL MEDICINE

## 2021-10-11 PROCEDURE — A9502 TC99M TETROFOSMIN: HCPCS | Performed by: INTERNAL MEDICINE

## 2021-10-11 PROCEDURE — 0 TECHNETIUM TETROFOSMIN KIT: Performed by: INTERNAL MEDICINE

## 2021-10-11 PROCEDURE — 78452 HT MUSCLE IMAGE SPECT MULT: CPT

## 2021-10-11 PROCEDURE — 93018 CV STRESS TEST I&R ONLY: CPT | Performed by: INTERNAL MEDICINE

## 2021-10-11 PROCEDURE — 93016 CV STRESS TEST SUPVJ ONLY: CPT | Performed by: INTERNAL MEDICINE

## 2021-10-11 PROCEDURE — 99226 PR SBSQ OBSERVATION CARE/DAY 35 MINUTES: CPT | Performed by: INTERNAL MEDICINE

## 2021-10-11 RX ORDER — DIPHENOXYLATE HYDROCHLORIDE AND ATROPINE SULFATE 2.5; .025 MG/1; MG/1
1 TABLET ORAL DAILY
Status: DISCONTINUED | OUTPATIENT
Start: 2021-10-12 | End: 2021-10-12 | Stop reason: HOSPADM

## 2021-10-11 RX ORDER — ESCITALOPRAM OXALATE 10 MG/1
10 TABLET ORAL DAILY
Status: DISCONTINUED | OUTPATIENT
Start: 2021-10-11 | End: 2021-10-12 | Stop reason: HOSPADM

## 2021-10-11 RX ORDER — DILTIAZEM HYDROCHLORIDE 120 MG/1
120 CAPSULE, COATED, EXTENDED RELEASE ORAL DAILY
Status: DISCONTINUED | OUTPATIENT
Start: 2021-10-11 | End: 2021-10-12 | Stop reason: HOSPADM

## 2021-10-11 RX ORDER — CETIRIZINE HYDROCHLORIDE 10 MG/1
10 TABLET ORAL DAILY
Status: DISCONTINUED | OUTPATIENT
Start: 2021-10-12 | End: 2021-10-12 | Stop reason: HOSPADM

## 2021-10-11 RX ORDER — LABETALOL 200 MG/1
100 TABLET, FILM COATED ORAL 2 TIMES DAILY
Status: DISCONTINUED | OUTPATIENT
Start: 2021-10-11 | End: 2021-10-12

## 2021-10-11 RX ADMIN — LABETALOL HYDROCHLORIDE 100 MG: 200 TABLET, FILM COATED ORAL at 20:25

## 2021-10-11 RX ADMIN — TETROFOSMIN 1 DOSE: 1.38 INJECTION, POWDER, LYOPHILIZED, FOR SOLUTION INTRAVENOUS at 11:00

## 2021-10-11 RX ADMIN — Medication 10 ML: at 08:53

## 2021-10-11 RX ADMIN — METOPROLOL TARTRATE 50 MG: 50 TABLET, FILM COATED ORAL at 14:46

## 2021-10-11 RX ADMIN — SODIUM CHLORIDE 5 MG/HR: 900 INJECTION, SOLUTION INTRAVENOUS at 02:44

## 2021-10-11 RX ADMIN — DILTIAZEM HYDROCHLORIDE 120 MG: 120 CAPSULE, COATED, EXTENDED RELEASE ORAL at 20:25

## 2021-10-11 RX ADMIN — ESCITALOPRAM OXALATE 10 MG: 10 TABLET ORAL at 20:25

## 2021-10-11 RX ADMIN — ENOXAPARIN SODIUM 110 MG: 120 INJECTION, SOLUTION INTRAVENOUS; SUBCUTANEOUS at 08:53

## 2021-10-11 RX ADMIN — TETROFOSMIN 1 DOSE: 1.38 INJECTION, POWDER, LYOPHILIZED, FOR SOLUTION INTRAVENOUS at 09:15

## 2021-10-11 RX ADMIN — ENOXAPARIN SODIUM 110 MG: 120 INJECTION, SOLUTION INTRAVENOUS; SUBCUTANEOUS at 20:25

## 2021-10-11 NOTE — PLAN OF CARE
Goal Outcome Evaluation:  Plan of Care Reviewed With: patient        Progress: improving  Outcome Summary: Pt. rested well overnight with no c/o pain, converted to NSR, NP states she wants to leave that up to cardiology, no acute changes, will continue to monitor

## 2021-10-11 NOTE — PROGRESS NOTES
[facility] Medicine Services Daily Progress Note    Patient Name: Tania Wolfe  : 1985  MRN: [id number]  Primary Care Physician:  Madison Cruz MD  Date of admission: 10/9/2021      Subjective      Chief Complaint: Palpitation    Patient Reports  10/11/2021  Patient denies any palpitation, chest pain or shortness of breath at this time      Review of Systems   Constitutional: Negative for chills and fever.   HENT: Negative for congestion.    Eyes: Negative for blurred vision.   Cardiovascular: Negative for chest pain and palpitations.   Respiratory: Negative for shortness of breath.    Endocrine: Negative for cold intolerance and heat intolerance.   Gastrointestinal: Negative for abdominal pain, nausea and vomiting.   Genitourinary: Negative for dysuria.   Neurological: Negative for focal weakness.   Psychiatric/Behavioral: Negative for altered mental status.        Objective      Vitals:   Temp:  [98 °F (36.7 °C)-98.5 °F (36.9 °C)] 98.5 °F (36.9 °C)  Heart Rate:  [] 86  Resp:  [16-20] 20  BP: (113-126)/(43-78) 126/44    Physical Exam  Vitals reviewed.   Constitutional:       General: She is not in acute distress.  HENT:      Head: Normocephalic and atraumatic.      Nose: Nose normal.   Eyes:      Extraocular Movements: Extraocular movements intact.      Conjunctiva/sclera: Conjunctivae normal.      Pupils: Pupils are equal, round, and reactive to light.   Cardiovascular:      Rate and Rhythm: Normal rate and regular rhythm.   Pulmonary:      Effort: No respiratory distress.      Breath sounds: Normal breath sounds.   Abdominal:      General: Bowel sounds are normal.      Tenderness: There is no abdominal tenderness.   Musculoskeletal:         General: Normal range of motion.      Cervical back: Neck supple.   Skin:     General: Skin is warm and dry.   Neurological:      Mental Status: She is alert.   Psychiatric:         Mood and Affect: Mood normal.            Result  Review    Result Review:  I have personally reviewed the results from the time of this admission to 10/11/2021 19:00 EDT and agree with these findings:  [x]  Laboratory  [x]  Microbiology  [x]  Radiology  []  EKG/Telemetry   []  Cardiology/Vascular   []  Pathology  []  Old records  []  Other:  Most notable findings include:         Assessment/Plan      Brief Patient Summary:  Patient is a 35-year-old female with history of paroxysmal atrial fibrillation-on Cardizem and labetalol and also depression.  Presented to [facility] on 10/9/2021 with complaints of palpitation, chest pain, diaphoresis and nausea.    In the ED patient was noted to have atrial fibrillation with rapid ventricular response and was given IV Cardizem bolus and started on Cardizem drip  She was admitted for further care.    cetirizine, 10 mg, Oral, Daily  dilTIAZem CD, 120 mg, Oral, Daily  enoxaparin, 110 mg, Subcutaneous, Q12H  escitalopram, 10 mg, Oral, Daily  labetalol, 100 mg, Oral, BID  multivitamin, 1 tablet, Oral, Daily  regadenoson, 0.4 mg, Intravenous, Once in imaging       Pharmacy to Dose enoxaparin (LOVENOX),          Active Hospital Problems:  Active Hospital Problems    Diagnosis    • **Atrial fibrillation with RVR (HCC)    • Chest pain    • Seasonal allergies    • Anxiety    • Depression    • Bell's palsy    • Paroxysmal atrial fibrillation (HCC)      Plan:   Atrial fibrillation with rapid ventricular response.  Has had history of paroxysmal atrial fibrillation with pregnancy.  Was given Cardizem bolus and started on Cardizem drip  Patient has converted back to normal sinus rhythm and rate controlled  TSH mildly elevated but free T4 within normal limits  Cardiologist consulted and recommended Myoview stress test which was done on 10/11/2021.  Patient on beta-blocker.  Further recommendation per cardiologist    Depression-on Lexapro        Seasonal allergies  On Zyrtec         DVT prophylaxis:  Medical DVT prophylaxis orders  are present.    CODE STATUS:    Code Status: CPR  Medical Interventions (Level of Support Prior to Arrest): Full      Disposition: Pending clinical progress    This patient has been examined with appropriate PPE . 10/11/21      Electronically signed by Diego Chapman MD, 10/11/21, 19:00 EDT.  [facility] Hospitalist Team

## 2021-10-11 NOTE — CASE MANAGEMENT/SOCIAL WORK
Discharge Planning Assessment   Glenn     Patient Name: Tania Wolfe  MRN: [id number]  Today's Date: 10/11/2021    Admit Date: 10/9/2021     Discharge Needs Assessment     Row Name 10/11/21 1732       Living Environment    Lives With parent(s); child(linda), dependent    Current Living Arrangements home/apartment/condo    Primary Care Provided by self    Provides Primary Care For no one    Family Caregiver if Needed parent(s)    Able to Return to Prior Arrangements yes       Resource/Environmental Concerns    Resource/Environmental Concerns none    Transportation Concerns car, none       Transition Planning    Patient/Family Anticipates Transition to home with family    Patient/Family Anticipated Services at Transition none    Transportation Anticipated family or friend will provide       Discharge Needs Assessment    Readmission Within the Last 30 Days no previous admission in last 30 days    Equipment Currently Used at Home none    Concerns to be Addressed no discharge needs identified; denies needs/concerns at this time    Anticipated Changes Related to Illness none    Equipment Needed After Discharge none    Provided Post Acute Provider List? N/A    Provided Post Acute Provider Quality & Resource List? N/A               Discharge Plan     Row Name 10/11/21 1213       Plan    Plan anticipate routine home with parents    Patient/Family in Agreement with Plan yes    Plan Comments met with patient at bedside. patient lives at home with her parents and 3 kids. patient drives. patient parents will provide transportation at DC. patient pcp and pharm confirmed. denies issues affording food or meds. patient independent with ADLs. denies needs. DC barriers: myoview pending                     Expected Discharge Date and Time     Expected Discharge Date Expected Discharge Time    Oct 11, 2021          Demographic Summary     Row Name 10/11/21 1732       General Information    Admission Type observation    Arrived From  emergency department    Referral Source admission list    Reason for Consult discharge planning    Preferred Language English     Used During This Interaction no               Functional Status     Row Name 10/11/21 1735       Functional Status    Usual Activity Tolerance excellent    Current Activity Tolerance excellent       Functional Status, IADL    Medications independent    Meal Preparation independent    Housekeeping independent    Laundry independent    Shopping independent              Met with patient in room wearing PPE: mask, face shield/goggles  Maintained distance greater than six feet and spent less than 15 minutes in the room.              Nahomi Hansen RN

## 2021-10-11 NOTE — PROGRESS NOTES
Referring Provider: Chucky Rosales MD    Reason for follow-up:  Atrial fibrillation     Patient Care Team:  Madison Cruz MD as PCP - General  Madison Cruz MD as PCP - Family Medicine  Erlin Rahman MD as Consulting Physician (Cardiology)    Subjective .  Feeling okay     ROS    The patient has been without any chest discomfort ,shortness of breath, palpitations, dizziness or syncope.  Denies having any headache ,abdominal pain ,nausea, vomiting , diarrhea constipation, loss of weight or loss of appetite.  Denies having any excessive bruising ,hematuria or blood in the stool.    Review of all systems negative except as indicated    History  Past Medical History:   Diagnosis Date   • Atrial fibrillation (HCC)     during pregnancies   • Bell palsy 01/21/2016   • Depression        Past Surgical History:   Procedure Laterality Date   • BREAST SURGERY  2018    breast reduction   • FIBULA SOFT TISSUE BIOPSY  1999    cyst removed       Family History   Adopted: Yes   Problem Relation Age of Onset   • Heart disease Mother    • Thyroid disease Father        Social History     Tobacco Use   • Smoking status: Never Smoker   • Smokeless tobacco: Never Used   Substance Use Topics   • Alcohol use: Yes     Comment: occ   • Drug use: Defer        Medications Prior to Admission   Medication Sig Dispense Refill Last Dose   • dilTIAZem CD (CARDIZEM CD) 120 MG 24 hr capsule Take 1 capsule by mouth Daily. 30 capsule 11 10/10/2021 at 0900   • escitalopram (Lexapro) 20 MG tablet Take 1 tablet by mouth Daily. (Patient taking differently: Take 10 mg by mouth Daily.) 90 tablet 2 10/9/2021 at Unknown time   • labetalol (NORMODYNE) 100 MG tablet Take 1 tablet by mouth 2 (Two) Times a Day. 180 tablet 1 10/10/2021 at 2200   • Multiple Vitamin (MULTIVITAMIN) capsule MULTIVITAMINS CAPS   10/10/2021 at 0900   • cetirizine (zyrTEC) 10 MG tablet Take 10 mg by mouth Daily.          Allergies  Patient has no known  "allergies.    Scheduled Meds:enoxaparin, 110 mg, Subcutaneous, Q12H  metoprolol tartrate, 50 mg, Oral, Q8H      Continuous Infusions:dilTIAZem, 5-15 mg/hr, Last Rate: Stopped (10/11/21 0410)  Pharmacy to Dose enoxaparin (LOVENOX),       PRN Meds:.•  acetaminophen **OR** acetaminophen **OR** acetaminophen  •  aluminum-magnesium hydroxide-simethicone  •  calcium carbonate  •  magnesium sulfate **OR** magnesium sulfate in D5W 1g/100mL (PREMIX)  •  melatonin  •  nitroglycerin  •  ondansetron **OR** ondansetron  •  Pharmacy to Dose enoxaparin (LOVENOX)  •  potassium chloride  •  potassium chloride  •  sodium chloride    Objective     VITAL SIGNS  Vitals:    10/10/21 2000 10/10/21 2200 10/11/21 0000 10/11/21 0200   BP:  123/43 120/61    BP Location:  Right arm     Patient Position:  Lying     Pulse: 103 77 72 70   Resp:  16     Temp:  98 °F (36.7 °C)     TempSrc:  Oral     SpO2: 98% 96% 98% 92%   Weight:       Height:           Flowsheet Rows      First Filed Value   Admission Height 177.8 cm (70\") Documented at 10/09/2021 2019   Admission Weight 120 kg (264 lb 8.8 oz) Documented at 10/09/2021 2019            Intake/Output Summary (Last 24 hours) at 10/11/2021 0649  Last data filed at 10/11/2021 0300  Gross per 24 hour   Intake 1280 ml   Output --   Net 1280 ml        TELEMETRY: Sinus rhythm    Physical Exam:  The patient is alert, oriented and in no distress.  Vital signs as noted above.  Head and neck revealed no carotid bruits or jugular venous distention.  No thyromegaly or lymphadenopathy is present  Lungs clear.  No wheezing.  Breath sounds are normal bilaterally.  Heart normal first and second heart sounds.  No murmur. No precordial rub is present.  No gallop is present.  Abdomen soft and nontender.  No organomegaly is present.  Extremities with good peripheral pulses without any pedal edema.  Skin warm and dry.  Musculoskeletal system is grossly normal  CNS grossly normal      Results Review:   I reviewed the " patient's new clinical results.  Lab Results (last 24 hours)     Procedure Component Value Units Date/Time    Basic Metabolic Panel [[id number]]  (Abnormal) Collected: 10/11/21 0254    Specimen: Blood Updated: 10/11/21 0345     Glucose 124 mg/dL      BUN 10 mg/dL      Creatinine 0.74 mg/dL      Sodium 138 mmol/L      Potassium 3.7 mmol/L      Comment: Slight hemolysis detected by analyzer. Results may be affected.        Chloride 104 mmol/L      CO2 20.0 mmol/L      Calcium 8.8 mg/dL      eGFR Non African Amer 89 mL/min/1.73      BUN/Creatinine Ratio 13.5     Anion Gap 14.0 mmol/L     Narrative:      GFR Normal >60  Chronic Kidney Disease <60  Kidney Failure <15      Magnesium [[id number]]  (Normal) Collected: 10/11/21 0254    Specimen: Blood Updated: 10/11/21 0345     Magnesium 2.0 mg/dL     CBC & Differential [[id number]]  (Abnormal) Collected: 10/11/21 0254    Specimen: Blood Updated: 10/11/21 0317    Narrative:      The following orders were created for panel order CBC & Differential.  Procedure                               Abnormality         Status                     ---------                               -----------         ------                     CBC Auto Differential[[id number]]        Abnormal            Final result                 Please view results for these tests on the individual orders.    CBC Auto Differential [[id number]]  (Abnormal) Collected: 10/11/21 0254    Specimen: Blood Updated: 10/11/21 0317     WBC 10.50 10*3/mm3      RBC 4.60 10*6/mm3      Hemoglobin 12.6 g/dL      Hematocrit 36.9 %      MCV 80.2 fL      MCH 27.4 pg      MCHC 34.2 g/dL      RDW 13.3 %      RDW-SD 37.6 fl      MPV 8.0 fL      Platelets 218 10*3/mm3      Neutrophil % 49.0 %      Lymphocyte % 42.0 %      Monocyte % 6.3 %      Eosinophil % 2.4 %      Basophil % 0.3 %      Neutrophils, Absolute 5.10 10*3/mm3      Lymphocytes, Absolute 4.40 10*3/mm3      Monocytes, Absolute 0.70 10*3/mm3      Eosinophils, Absolute 0.30  10*3/mm3      Basophils, Absolute 0.00 10*3/mm3      nRBC 0.1 /100 WBC           Imaging Results (Last 24 Hours)     Procedure Component Value Units Date/Time    XR Chest 1 View [[id number]] Collected: 10/10/21 0833     Updated: 10/10/21 0836    Narrative:      XR CHEST 1 VW-     Date of Exam: 10/9/2021 8:45 PM     Indication: Chest pain protocol.     Comparison Exams: None available.     Technique: Single AP chest radiograph     FINDINGS:  The lungs are clear. The heart and mediastinal contours appear normal.  The pulmonary vasculature appears normal. The osseous structures appear  intact.       Impression:      No acute cardiopulmonary process identified.     Electronically Signed By-Orestes Dotson MD On:10/10/2021 8:34 AM  This report was finalized on [id number] by  Orestes Dotson MD.      LAB RESULTS (LAST 7 DAYS)    CBC  Results from last 7 days   Lab Units 10/11/21  0254 10/10/21  0428 10/09/21  2038   WBC 10*3/mm3 10.50 10.30 11.00*   RBC 10*6/mm3 4.60 4.77 5.20   HEMOGLOBIN g/dL 12.6 13.1 14.5   HEMATOCRIT % 36.9 38.6 42.0   MCV fL 80.2 80.8 80.8   PLATELETS 10*3/mm3 218 223 246       BMP  Results from last 7 days   Lab Units 10/11/21  0254 10/10/21  0428 10/09/21  2231 10/09/21  2038   SODIUM mmol/L 138 139  --  138   POTASSIUM mmol/L 3.7 3.5 3.5 3.8   CHLORIDE mmol/L 104 103  --  100   CO2 mmol/L 20.0* 22.0  --  22.0   BUN mg/dL 10 11  --  11   CREATININE mg/dL 0.74 0.71  --  0.82   GLUCOSE mg/dL 124* 108*  --  160*   MAGNESIUM mg/dL 2.0  --  2.0 2.0       CMP   Results from last 7 days   Lab Units 10/11/21  0254 10/10/21  0428 10/09/21  2231 10/09/21  2038   SODIUM mmol/L 138 139  --  138   POTASSIUM mmol/L 3.7 3.5 3.5 3.8   CHLORIDE mmol/L 104 103  --  100   CO2 mmol/L 20.0* 22.0  --  22.0   BUN mg/dL 10 11  --  11   CREATININE mg/dL 0.74 0.71  --  0.82   GLUCOSE mg/dL 124* 108*  --  160*   ALBUMIN g/dL  --   --   --  4.50   BILIRUBIN mg/dL  --   --   --  0.5   ALK PHOS U/L  --   --   --  96   AST  (SGOT) U/L  --   --   --  25   ALT (SGPT) U/L  --   --   --  20         BNP        TROPONIN  Results from last 7 days   Lab Units 10/09/21  2231   TROPONIN T ng/mL <0.010       CoAg  Results from last 7 days   Lab Units 10/09/21  2038   INR  0.98   APTT seconds 28.4       Creatinine Clearance  Estimated Creatinine Clearance: 143.9 mL/min (by C-G formula based on SCr of 0.74 mg/dL).    ABG        Radiology  XR Chest 1 View    Result Date: 10/10/2021  No acute cardiopulmonary process identified.  Electronically Signed By-Orestes Dotson MD On:10/10/2021 8:34 AM This report was finalized on [id number] by  Orestes Dotson MD.              EKG                      I personally viewed and interpreted the patient's EKG/Telemetry data: Atrial fibrillation with rapid ventricular response.  Subsequently has converted to sinus rhythm.    ECHOCARDIOGRAM:    Results for orders placed during the hospital encounter of 06/29/21    Adult Transthoracic Echo Complete W/ Cont if Necessary Per Protocol    Interpretation Summary  Normal LV size and contractility EF of 60 to 65%  Normal RV size  Normal atrial size  Aortic valve, mitral valve, tricuspid valve appears structurally normal, no significant regurgitation seen.  No pericardial effusion seen.  Proximal aorta appears normal in size.          STRESS MYOVIEW:    Cardiolite (Tc-99m Sestamibi) stress test    CARDIAC CATHETERIZATION:            OTHER:         Assessment/Plan     Principal Problem:    Atrial fibrillation with RVR (HCC)  Active Problems:    Depression    Anxiety    Bell's palsy    Paroxysmal atrial fibrillation (HCC)    Seasonal allergies    REASON FOR CONSULTATION:  35-year-old female with no known history of premature coronary disease or structural heart disease.  Patient does have known history of paroxysmal atrial fibrillation.       CC:         HISTORY OF PRESENT ILLNESS:   Patient presented to [facility]/2021 with complaint of palpitations and  concern for recurrence of atrial fibrillation.  She states she first experienced A. fib with her first pregnancy and has had episodes with each pregnancy.  Last known episode was 2011.  Patient reports over the past year she has noted fleeting episodes of palpitations lasting for just a few seconds.  She has an iWatch with telemetry capability and has noted irregular rhythm.  She has been evaluated by Dr. Crow on 10/4/2021.  She also reports some midsternal chest pressure that is worse with exertion and improves with rest.  She becomes diaphoretic and feels short of breath with episodes of increased heart rate.  Her iWatch recorded heart rate 200-210.  She was symptomatic and tried vagal maneuvers which did not improve the rate.  She became concerned so she presented to the ER.  In the ER, initial heart rate 205.  EKG showed atrial fibrillation with rate of 119.  She was given aspirin, IV Cardizem bolus and started on drip which is currently at 10 mg.  She was also given IV fluids.     Upon my evaluation, she is sitting up in bed and appears to be resting comfortably.  Heart rate is currently 107.  Patient states when she gets up to the bathroom it does increase to 120.  She denies any recent presyncopal or syncopal episodes, no dizziness or lightheadedness.  She has had no lower extremity edema.           REVIEW OF SYSTEMS:  Pertinent items are noted in HPI, all other systems reviewed and negative     OBJECTIVE   Troponin negative  Triglycerides 366  proBNP 100.1  TSH 4.44/Free T4 0.99  Magnesium 2     ASSESSMENT  Atrial fibrillation with rapid ventricular response  History of A. fib with pregnancy  History of Bell's palsy  History of depression  Obesity     PLAN  Atrial fibrillation with rapid ventricular response  Patient has converted to sinus rhythm with intravenous Cardizem.  Patient is on labetalol 100 mg twice a day.  Patient is on Cardizem  mg a day.  Echocardiogram 6/29/2021 was normal.  Patient  to have stress Cardiolite test  Close cardiac monitoring.  Further plan will depend on patient's progress.  ]]]]]]]]]]]]]]]]]]             Maritza Chambers MD  10/11/21  06:49 EDT

## 2021-10-12 ENCOUNTER — READMISSION MANAGEMENT (OUTPATIENT)
Dept: CALL CENTER | Facility: HOSPITAL | Age: 36
End: 2021-10-12

## 2021-10-12 VITALS
HEART RATE: 92 BPM | TEMPERATURE: 97.6 F | RESPIRATION RATE: 18 BRPM | OXYGEN SATURATION: 99 % | DIASTOLIC BLOOD PRESSURE: 65 MMHG | WEIGHT: 251.32 LBS | BODY MASS INDEX: 35.98 KG/M2 | SYSTOLIC BLOOD PRESSURE: 135 MMHG | HEIGHT: 70 IN

## 2021-10-12 LAB
ANION GAP SERPL CALCULATED.3IONS-SCNC: 13 MMOL/L (ref 5–15)
BASOPHILS # BLD AUTO: 0 10*3/MM3 (ref 0–0.2)
BASOPHILS NFR BLD AUTO: 0.5 % (ref 0–1.5)
BUN SERPL-MCNC: 9 MG/DL (ref 6–20)
BUN/CREAT SERPL: 12.3 (ref 7–25)
CALCIUM SPEC-SCNC: 8.6 MG/DL (ref 8.6–10.5)
CHLORIDE SERPL-SCNC: 102 MMOL/L (ref 98–107)
CO2 SERPL-SCNC: 23 MMOL/L (ref 22–29)
CREAT SERPL-MCNC: 0.73 MG/DL (ref 0.57–1)
DEPRECATED RDW RBC AUTO: 37.6 FL (ref 37–54)
EOSINOPHIL # BLD AUTO: 0.2 10*3/MM3 (ref 0–0.4)
EOSINOPHIL NFR BLD AUTO: 2.7 % (ref 0.3–6.2)
ERYTHROCYTE [DISTWIDTH] IN BLOOD BY AUTOMATED COUNT: 13.2 % (ref 12.3–15.4)
GFR SERPL CREATININE-BSD FRML MDRD: 91 ML/MIN/1.73
GLUCOSE SERPL-MCNC: 104 MG/DL (ref 65–99)
HCT VFR BLD AUTO: 37 % (ref 34–46.6)
HGB BLD-MCNC: 12.6 G/DL (ref 12–15.9)
LYMPHOCYTES # BLD AUTO: 3.3 10*3/MM3 (ref 0.7–3.1)
LYMPHOCYTES NFR BLD AUTO: 44.5 % (ref 19.6–45.3)
MCH RBC QN AUTO: 27.5 PG (ref 26.6–33)
MCHC RBC AUTO-ENTMCNC: 34.1 G/DL (ref 31.5–35.7)
MCV RBC AUTO: 80.8 FL (ref 79–97)
MONOCYTES # BLD AUTO: 0.5 10*3/MM3 (ref 0.1–0.9)
MONOCYTES NFR BLD AUTO: 7 % (ref 5–12)
NEUTROPHILS NFR BLD AUTO: 3.4 10*3/MM3 (ref 1.7–7)
NEUTROPHILS NFR BLD AUTO: 45.3 % (ref 42.7–76)
NRBC BLD AUTO-RTO: 0 /100 WBC (ref 0–0.2)
PLATELET # BLD AUTO: 205 10*3/MM3 (ref 140–450)
PMV BLD AUTO: 8.2 FL (ref 6–12)
POTASSIUM SERPL-SCNC: 3.5 MMOL/L (ref 3.5–5.2)
RBC # BLD AUTO: 4.58 10*6/MM3 (ref 3.77–5.28)
SODIUM SERPL-SCNC: 138 MMOL/L (ref 136–145)
T3 SERPL-MCNC: 177 NG/DL (ref 71–180)
T3FREE SERPL-MCNC: 4.6 PG/ML (ref 2–4.4)
WBC # BLD AUTO: 7.5 10*3/MM3 (ref 3.4–10.8)

## 2021-10-12 PROCEDURE — 85025 COMPLETE CBC W/AUTO DIFF WBC: CPT | Performed by: NURSE PRACTITIONER

## 2021-10-12 PROCEDURE — 99214 OFFICE O/P EST MOD 30 MIN: CPT | Performed by: INTERNAL MEDICINE

## 2021-10-12 PROCEDURE — 80048 BASIC METABOLIC PNL TOTAL CA: CPT | Performed by: NURSE PRACTITIONER

## 2021-10-12 PROCEDURE — 99217 PR OBSERVATION CARE DISCHARGE MANAGEMENT: CPT | Performed by: INTERNAL MEDICINE

## 2021-10-12 PROCEDURE — 36415 COLL VENOUS BLD VENIPUNCTURE: CPT | Performed by: NURSE PRACTITIONER

## 2021-10-12 PROCEDURE — G0378 HOSPITAL OBSERVATION PER HR: HCPCS

## 2021-10-12 RX ORDER — METOPROLOL TARTRATE 50 MG/1
50 TABLET, FILM COATED ORAL EVERY 12 HOURS SCHEDULED
Status: DISCONTINUED | OUTPATIENT
Start: 2021-10-12 | End: 2021-10-12 | Stop reason: HOSPADM

## 2021-10-12 RX ORDER — METOPROLOL TARTRATE 50 MG/1
50 TABLET, FILM COATED ORAL EVERY 12 HOURS SCHEDULED
Qty: 30 TABLET | Refills: 0 | Status: SHIPPED | OUTPATIENT
Start: 2021-10-12 | End: 2021-10-22 | Stop reason: SDUPTHER

## 2021-10-12 RX ADMIN — THERA TABS 1 TABLET: TAB at 09:59

## 2021-10-12 RX ADMIN — CETIRIZINE HYDROCHLORIDE 10 MG: 10 TABLET, FILM COATED ORAL at 09:59

## 2021-10-12 RX ADMIN — Medication 10 ML: at 10:01

## 2021-10-12 RX ADMIN — APIXABAN 5 MG: 5 TABLET, FILM COATED ORAL at 09:59

## 2021-10-12 RX ADMIN — METOPROLOL TARTRATE 50 MG: 50 TABLET, FILM COATED ORAL at 10:00

## 2021-10-12 NOTE — PLAN OF CARE
Goal Outcome Evaluation:  Plan of Care Reviewed With: patient wanting to go home today,asap.

## 2021-10-12 NOTE — DISCHARGE SUMMARY
[facility] Medicine Services  DISCHARGE SUMMARY    Patient Name: Tania Wolfe  : 1985  MRN: [id number]    Date of Admission: 10/9/2021  Date of Discharge: 10/12/2021.  Primary Care Physician: Madison Cruz MD      Presenting Problem:   Lightheaded [R42]  Atrial fibrillation with rapid ventricular response (HCC) [I48.91]  Atrial fibrillation with RVR (HCC) [I48.91]  Dyspnea, unspecified type [R06.00]  Chest pain, unspecified type [R07.9]    Active and Resolved Hospital Problems:  Active Hospital Problems    Diagnosis POA   • **Atrial fibrillation with RVR (HCC) [I48.91] Yes   • Chest pain [R07.9] Yes   • Seasonal allergies [J30.2] Yes   • Anxiety [F41.9] Yes   • Depression [F32.A] Yes   • Bell's palsy [G51.0] Yes   • Paroxysmal atrial fibrillation (HCC) [I48.0] Yes      Resolved Hospital Problems   No resolved problems to display.         Hospital Course     Hospital Course:  Patient is a 35-year-old female with history of paroxysmal atrial fibrillation-on Cardizem and labetalol and also depression.  Presented to [facility] on 10/9/2021 with complaints of palpitation, chest pain, diaphoresis and nausea.     In the ED patient was noted to have atrial fibrillation with rapid ventricular response and was given IV Cardizem bolus and started on Cardizem drip  She was admitted for further care.    Conditions addressed while inpatient are as stated below    Atrial fibrillation with rapid ventricular response.  Has had history of paroxysmal atrial fibrillation with pregnancy.  Was given Cardizem bolus and started on Cardizem drip  Patient has converted back to normal sinus rhythm and rate controlled  TSH mildly elevated but free T4 within normal limits  Cardiologist consulted and recommended Myoview stress test which was done on 10/11/2021.  Myoview stress test-normal  2D echo-nonrevealing  Labetalol was changed to metoprolol 50 mg twice daily.  Patient was continued  on Cardizem  mg daily.  She was also started on Eliquis 5 mg twice daily.  Patient to follow-up with primary cardiologist in 2 weeks     Depression-on Lexapro        Seasonal allergies  On Zyrtec    Condition at discharge-stable    DISCHARGE Follow Up Recommendations for labs and diagnostics:     Reasons For Change In Medications and Indications for New Medications:      Day of Discharge     Vital Signs:  Temp:  [97.6 °F (36.4 °C)-98.6 °F (37 °C)] 97.6 °F (36.4 °C)  Heart Rate:  [77-97] 92  Resp:  [16-20] 18  BP: (111-137)/(44-78) 135/65    Physical Exam:  Physical Exam  Vitals reviewed.   Constitutional:       General: She is not in acute distress.  HENT:      Head: Normocephalic and atraumatic.      Nose: Nose normal.      Mouth/Throat:      Mouth: Mucous membranes are moist.   Eyes:      Extraocular Movements: Extraocular movements intact.      Conjunctiva/sclera: Conjunctivae normal.      Pupils: Pupils are equal, round, and reactive to light.   Cardiovascular:      Rate and Rhythm: Normal rate and regular rhythm.   Pulmonary:      Effort: No respiratory distress.      Breath sounds: Normal breath sounds.   Abdominal:      General: Bowel sounds are normal.      Palpations: Abdomen is soft.      Tenderness: There is no abdominal tenderness.   Musculoskeletal:         General: Normal range of motion.      Cervical back: Neck supple.   Skin:     General: Skin is warm and dry.   Neurological:      Mental Status: She is alert and oriented to person, place, and time.   Psychiatric:         Mood and Affect: Mood normal.          Pertinent  and/or Most Recent Results     LAB RESULTS:      Lab 10/12/21  0410 10/11/21  0254 10/10/21  0428 10/09/21  2038   WBC 7.50 10.50 10.30 11.00*   HEMOGLOBIN 12.6 12.6 13.1 14.5   HEMATOCRIT 37.0 36.9 38.6 42.0   PLATELETS 205 218 223 246   NEUTROS ABS 3.40 5.10 5.00 6.20   LYMPHS ABS 3.30* 4.40* 4.40* 3.70*   MONOS ABS 0.50 0.70 0.60 0.70   EOS ABS 0.20 0.30 0.30 0.30   MCV 80.8  80.2 80.8 80.8   PROTIME  --   --   --  10.9   APTT  --   --   --  28.4         Lab 10/12/21  0410 10/11/21  0254 10/10/21  0428 10/09/21  2231 10/09/21  2038   SODIUM 138 138 139  --  138   POTASSIUM 3.5 3.7 3.5 3.5 3.8   CHLORIDE 102 104 103  --  100   CO2 23.0 20.0* 22.0  --  22.0   ANION GAP 13.0 14.0 14.0  --  16.0*   BUN 9 10 11  --  11   CREATININE 0.73 0.74 0.71  --  0.82   GLUCOSE 104* 124* 108*  --  160*   CALCIUM 8.6 8.8 8.7  --  9.2   MAGNESIUM  --  2.0  --  2.0 2.0   TSH  --   --   --   --  4.440*         Lab 10/09/21  2038   TOTAL PROTEIN 7.6   ALBUMIN 4.50   GLOBULIN 3.1   ALT (SGPT) 20   AST (SGOT) 25   BILIRUBIN 0.5   ALK PHOS 96         Lab 10/09/21  2231 10/09/21  2038   PROBNP 100.1  --    TROPONIN T <0.010 <0.010   PROTIME  --  10.9   INR  --  0.98         Lab 10/09/21  2231   CHOLESTEROL 188   LDL CHOL 96   HDL CHOL 31*   TRIGLYCERIDES 366*             Brief Urine Lab Results     None        Microbiology Results (last 10 days)     Procedure Component Value - Date/Time    COVID PRE-OP / PRE-PROCEDURE SCREENING ORDER (NO ISOLATION) - Swab, Nasopharynx [[id number]]  (Normal) Collected: 10/09/21 2259    Lab Status: Final result Specimen: Swab from Nasopharynx Updated: 10/10/21 0015    Narrative:      The following orders were created for panel order COVID PRE-OP / PRE-PROCEDURE SCREENING ORDER (NO ISOLATION) - Swab, Nasopharynx.  Procedure                               Abnormality         Status                     ---------                               -----------         ------                     COVID-19,CEPHEID/EDWIN/BD...[[id number]]  Normal              Final result                 Please view results for these tests on the individual orders.    COVID-19,CEPHEID/EDWIN/BDMAX,COR/ALEJANDRA/PAD/LIANE IN-HOUSE(OR EMERGENT/ADD-ON),NP SWAB IN TRANSPORT MEDIA 3-4 HR TAT, RT-PCR - Swab, Nasopharynx [[id number]]  (Normal) Collected: 10/09/21 2307    Lab Status: Final result Specimen: Swab from Nasopharynx  Updated: 10/10/21 0015     COVID19 Not Detected    Narrative:      Fact sheet for providers: https://www.fda.gov/media/116225/download     Fact sheet for patients: https://www.fda.gov/media/651438/download  Fact sheet for providers: https://www.fda.gov/media/982838/download     Fact sheet for patients: https://www.fda.gov/media/565458/download          XR Chest 1 View    Result Date: 10/10/2021  Impression: No acute cardiopulmonary process identified.  Electronically Signed By-Orestes Dotson MD On:10/10/2021 8:34 AM This report was finalized on [id number] by  Orestes Dotson MD.              Results for orders placed during the hospital encounter of 06/29/21    Adult Transthoracic Echo Complete W/ Cont if Necessary Per Protocol    Interpretation Summary  Normal LV size and contractility EF of 60 to 65%  Normal RV size  Normal atrial size  Aortic valve, mitral valve, tricuspid valve appears structurally normal, no significant regurgitation seen.  No pericardial effusion seen.  Proximal aorta appears normal in size.      Labs Pending at Discharge:      Procedures Performed           Consults:   Consults     Date and Time Order Name Status Description    10/10/2021  1:46 AM Inpatient Cardiology Consult Completed     10/9/2021 10:17 PM Hospitalist (on-call MD unless specified) Completed             Discharge Details        Discharge Medications      New Medications      Instructions Start Date   apixaban 5 MG tablet tablet  Commonly known as: ELIQUIS   5 mg, Oral, Every 12 Hours Scheduled      metoprolol tartrate 50 MG tablet  Commonly known as: LOPRESSOR   50 mg, Oral, Every 12 Hours Scheduled         Changes to Medications      Instructions Start Date   escitalopram 20 MG tablet  Commonly known as: Lexapro  What changed: how much to take   20 mg, Oral, Daily         Continue These Medications      Instructions Start Date   cetirizine 10 MG tablet  Commonly known as: zyrTEC   10 mg, Oral, Daily PRN       dilTIAZem  MG 24 hr capsule  Commonly known as: CARDIZEM CD   120 mg, Oral, Daily      multivitamin capsule   1 capsule, Oral, Daily         Stop These Medications    labetalol 100 MG tablet  Commonly known as: NORMODYNE            No Known Allergies      Discharge Disposition:   Home or Self Care    Diet:  Hospital:  Diet Order   Procedures   • Diet Regular         Discharge Activity:   Activity Instructions     Activity as Tolerated              CODE STATUS:  Code Status and Medical Interventions:   Ordered at: 10/09/21 2300     Code Status:    CPR     Medical Interventions (Level of Support Prior to Arrest):    Full         Future Appointments   Date Time Provider Department Center   9/13/2022 10:20 AM Erlin Rahman MD [facility]       Additional Instructions for the Follow-ups that You Need to Schedule     Discharge Follow-up with PCP   As directed       Currently Documented PCP:    Madison Cruz MD    PCP Phone Number:    [phone]     Follow Up Details: Follow-up with PCP as needed         Discharge Follow-up with Specified Provider: Follow-up with your primary cardiologist in 2 weeks; 2 Weeks   As directed      To: Follow-up with your primary cardiologist in 2 weeks    Follow Up: 2 Weeks               Time spent on Discharge including face to face service:  33 minutes    This patient has been examined with appropriate PPE . 10/12/21      Signature: Electronically signed by Diego Chapman MD, 10/12/21, 12:45 PM EDT.

## 2021-10-12 NOTE — PLAN OF CARE
Problem: Adult Inpatient Plan of Care  Goal: Plan of Care Review  Outcome: Ongoing, Progressing  Goal: Patient-Specific Goal (Individualized)  Outcome: Ongoing, Progressing  Goal: Absence of Hospital-Acquired Illness or Injury  Outcome: Ongoing, Progressing  Intervention: Identify and Manage Fall Risk  Recent Flowsheet Documentation  Taken 10/12/2021 1200 by Tania Somers RN  Safety Promotion/Fall Prevention:   assistive device/personal items within reach   clutter free environment maintained   room organization consistent   safety round/check completed  Taken 10/12/2021 1000 by Tania Somers RN  Safety Promotion/Fall Prevention:   assistive device/personal items within reach   clutter free environment maintained   room organization consistent   safety round/check completed  Taken 10/12/2021 0800 by Tania Somers RN  Safety Promotion/Fall Prevention:   assistive device/personal items within reach   clutter free environment maintained   room organization consistent   safety round/check completed  Intervention: Prevent Infection  Recent Flowsheet Documentation  Taken 10/12/2021 1200 by Tania Somers RN  Infection Prevention:   cohorting utilized   hand hygiene promoted   rest/sleep promoted   single patient room provided  Taken 10/12/2021 1000 by Tania Somers RN  Infection Prevention:   cohorting utilized   hand hygiene promoted   rest/sleep promoted   single patient room provided  Taken 10/12/2021 0800 by Tania Somers RN  Infection Prevention:   cohorting utilized   hand hygiene promoted   rest/sleep promoted   single patient room provided  Goal: Optimal Comfort and Wellbeing  Outcome: Ongoing, Progressing  Intervention: Provide Person-Centered Care  Recent Flowsheet Documentation  Taken 10/12/2021 1200 by Tania Somers RN  Trust Relationship/Rapport:   questions answered   questions encouraged  Taken 10/12/2021 0800 by Tania Somers RN  Trust Relationship/Rapport:   questions  answered   care explained   thoughts/feelings acknowledged  Goal: Readiness for Transition of Care  Outcome: Ongoing, Progressing     Problem: Arrhythmia/Dysrhythmia  Goal: Normalized Cardiac Rhythm  Outcome: Ongoing, Progressing   Goal Outcome Evaluation:         Patient took all medications appropriately. No new issues reported this shift. Ready for discharge.

## 2021-10-12 NOTE — PROGRESS NOTES
Referring Provider: Diego Chapman MD    Reason for follow-up:  Atrial fibrillation     Patient Care Team:  Madison Cruz MD as PCP - General  Madison Cruz MD as PCP - Family Medicine  Erlin Rahman MD as Consulting Physician (Cardiology)  Sima Blackmon, SHEN as Ambulatory     Subjective .  Feeling okay     ROS    The patient has been without any chest discomfort ,shortness of breath, palpitations, dizziness or syncope.  Denies having any headache ,abdominal pain ,nausea, vomiting , diarrhea constipation, loss of weight or loss of appetite.  Denies having any excessive bruising ,hematuria or blood in the stool.    Review of all systems negative except as indicated    History  Past Medical History:   Diagnosis Date   • Atrial fibrillation (HCC)     during pregnancies   • Bell palsy 01/21/2016   • Depression        Past Surgical History:   Procedure Laterality Date   • BREAST SURGERY  2018    breast reduction   • FIBULA SOFT TISSUE BIOPSY  1999    cyst removed       Family History   Adopted: Yes   Problem Relation Age of Onset   • Heart disease Mother    • Thyroid disease Father        Social History     Tobacco Use   • Smoking status: Never Smoker   • Smokeless tobacco: Never Used   Substance Use Topics   • Alcohol use: Yes     Comment: occ   • Drug use: Defer        Medications Prior to Admission   Medication Sig Dispense Refill Last Dose   • cetirizine (zyrTEC) 10 MG tablet Take 10 mg by mouth Daily As Needed.      • dilTIAZem CD (CARDIZEM CD) 120 MG 24 hr capsule Take 1 capsule by mouth Daily. 30 capsule 11 10/10/2021 at 0900   • escitalopram (Lexapro) 20 MG tablet Take 1 tablet by mouth Daily. (Patient taking differently: Take 10 mg by mouth Daily.) 90 tablet 2 10/9/2021 at Unknown time   • labetalol (NORMODYNE) 100 MG tablet Take 1 tablet by mouth 2 (Two) Times a Day. 180 tablet 1 10/10/2021 at 2200   • Multiple Vitamin (MULTIVITAMIN) capsule Take 1 capsule by mouth Daily.    "10/10/2021 at 0900       Allergies  Patient has no known allergies.    Scheduled Meds:cetirizine, 10 mg, Oral, Daily  dilTIAZem CD, 120 mg, Oral, Daily  enoxaparin, 110 mg, Subcutaneous, Q12H  escitalopram, 10 mg, Oral, Daily  labetalol, 100 mg, Oral, BID  multivitamin, 1 tablet, Oral, Daily  regadenoson, 0.4 mg, Intravenous, Once in imaging      Continuous Infusions:Pharmacy to Dose enoxaparin (LOVENOX),       PRN Meds:.•  acetaminophen **OR** acetaminophen **OR** acetaminophen  •  aluminum-magnesium hydroxide-simethicone  •  calcium carbonate  •  magnesium sulfate **OR** magnesium sulfate in D5W 1g/100mL (PREMIX)  •  melatonin  •  nitroglycerin  •  ondansetron **OR** ondansetron  •  Pharmacy to Dose enoxaparin (LOVENOX)  •  potassium chloride  •  potassium chloride  •  sodium chloride    Objective     VITAL SIGNS  Vitals:    10/11/21 1700 10/11/21 2300 10/12/21 0214 10/12/21 0500   BP: 126/44 137/71 127/59    Pulse: 86 97 80    Resp: 20 16 18    Temp: 98.5 °F (36.9 °C) 98.6 °F (37 °C) 98.1 °F (36.7 °C)    TempSrc: Oral Oral Oral    SpO2: 97% 100% 97%    Weight:    114 kg (251 lb 5.2 oz)   Height:           Flowsheet Rows      First Filed Value   Admission Height 177.8 cm (70\") Documented at 10/09/2021 2019   Admission Weight 120 kg (264 lb 8.8 oz) Documented at 10/09/2021 2019            Intake/Output Summary (Last 24 hours) at 10/12/2021 0534  Last data filed at 10/11/2021 1200  Gross per 24 hour   Intake 240 ml   Output --   Net 240 ml        TELEMETRY: Sinus rhythm    Physical Exam:  The patient is alert, oriented and in no distress.  Vital signs as noted above.  Head and neck revealed no carotid bruits or jugular venous distention.  No thyromegaly or lymphadenopathy is present  Lungs clear.  No wheezing.  Breath sounds are normal bilaterally.  Heart normal first and second heart sounds.  No murmur. No precordial rub is present.  No gallop is present.  Abdomen soft and nontender.  No organomegaly is " present.  Extremities with good peripheral pulses without any pedal edema.  Skin warm and dry.  Musculoskeletal system is grossly normal  CNS grossly normal      Results Review:   I reviewed the patient's new clinical results.  Lab Results (last 24 hours)     Procedure Component Value Units Date/Time    CBC & Differential [[id number]]  (Abnormal) Collected: 10/12/21 0410    Specimen: Blood Updated: 10/12/21 0526    Narrative:      The following orders were created for panel order CBC & Differential.  Procedure                               Abnormality         Status                     ---------                               -----------         ------                     CBC Auto Differential[[id number]]        Abnormal            Final result                 Please view results for these tests on the individual orders.    CBC Auto Differential [[id number]]  (Abnormal) Collected: 10/12/21 0410    Specimen: Blood Updated: 10/12/21 0526     WBC 7.50 10*3/mm3      RBC 4.58 10*6/mm3      Hemoglobin 12.6 g/dL      Hematocrit 37.0 %      MCV 80.8 fL      MCH 27.5 pg      MCHC 34.1 g/dL      RDW 13.2 %      RDW-SD 37.6 fl      MPV 8.2 fL      Platelets 205 10*3/mm3      Neutrophil % 45.3 %      Lymphocyte % 44.5 %      Monocyte % 7.0 %      Eosinophil % 2.7 %      Basophil % 0.5 %      Neutrophils, Absolute 3.40 10*3/mm3      Lymphocytes, Absolute 3.30 10*3/mm3      Monocytes, Absolute 0.50 10*3/mm3      Eosinophils, Absolute 0.20 10*3/mm3      Basophils, Absolute 0.00 10*3/mm3      nRBC 0.0 /100 WBC     Basic Metabolic Panel [[id number]] Collected: 10/12/21 0410    Specimen: Blood Updated: 10/12/21 0514          Imaging Results (Last 24 Hours)     ** No results found for the last 24 hours. **      LAB RESULTS (LAST 7 DAYS)    CBC  Results from last 7 days   Lab Units 10/12/21  0410 10/11/21  0254 10/10/21  0428 10/09/21  2038   WBC 10*3/mm3 7.50 10.50 10.30 11.00*   RBC 10*6/mm3 4.58 4.60 4.77 5.20   HEMOGLOBIN g/dL 12.6 12.6  13.1 14.5   HEMATOCRIT % 37.0 36.9 38.6 42.0   MCV fL 80.8 80.2 80.8 80.8   PLATELETS 10*3/mm3 205 218 223 246       BMP  Results from last 7 days   Lab Units 10/11/21  0254 10/10/21  0428 10/09/21  2231 10/09/21  2038   SODIUM mmol/L 138 139  --  138   POTASSIUM mmol/L 3.7 3.5 3.5 3.8   CHLORIDE mmol/L 104 103  --  100   CO2 mmol/L 20.0* 22.0  --  22.0   BUN mg/dL 10 11  --  11   CREATININE mg/dL 0.74 0.71  --  0.82   GLUCOSE mg/dL 124* 108*  --  160*   MAGNESIUM mg/dL 2.0  --  2.0 2.0       CMP   Results from last 7 days   Lab Units 10/11/21  0254 10/10/21  0428 10/09/21  2231 10/09/21  2038   SODIUM mmol/L 138 139  --  138   POTASSIUM mmol/L 3.7 3.5 3.5 3.8   CHLORIDE mmol/L 104 103  --  100   CO2 mmol/L 20.0* 22.0  --  22.0   BUN mg/dL 10 11  --  11   CREATININE mg/dL 0.74 0.71  --  0.82   GLUCOSE mg/dL 124* 108*  --  160*   ALBUMIN g/dL  --   --   --  4.50   BILIRUBIN mg/dL  --   --   --  0.5   ALK PHOS U/L  --   --   --  96   AST (SGOT) U/L  --   --   --  25   ALT (SGPT) U/L  --   --   --  20         BNP        TROPONIN  Results from last 7 days   Lab Units 10/09/21  2231   TROPONIN T ng/mL <0.010       CoAg  Results from last 7 days   Lab Units 10/09/21  2038   INR  0.98   APTT seconds 28.4       Creatinine Clearance  Estimated Creatinine Clearance: 145.2 mL/min (by C-G formula based on SCr of 0.74 mg/dL).    ABG        Radiology  No radiology results for the last day            EKG                      I personally viewed and interpreted the patient's EKG/Telemetry data: Atrial fibrillation with rapid ventricular response.  Subsequently has converted to sinus rhythm.    ECHOCARDIOGRAM:    Results for orders placed during the hospital encounter of 06/29/21    Adult Transthoracic Echo Complete W/ Cont if Necessary Per Protocol    Interpretation Summary  Normal LV size and contractility EF of 60 to 65%  Normal RV size  Normal atrial size  Aortic valve, mitral valve, tricuspid valve appears structurally normal,  no significant regurgitation seen.  No pericardial effusion seen.  Proximal aorta appears normal in size.          STRESS MYOVIEW:    Cardiolite (Tc-99m Sestamibi) stress test    CARDIAC CATHETERIZATION:            OTHER:         Assessment/Plan     Principal Problem:    Atrial fibrillation with RVR (HCC)  Active Problems:    Depression    Anxiety    Bell's palsy    Paroxysmal atrial fibrillation (HCC)    Seasonal allergies    Chest pain    REASON FOR CONSULTATION:  35-year-old female with no known history of premature coronary disease or structural heart disease.  Patient does have known history of paroxysmal atrial fibrillation.       CC:         HISTORY OF PRESENT ILLNESS:   Patient presented to [facility] 10/9/2021 with complaint of palpitations and concern for recurrence of atrial fibrillation.  She states she first experienced A. fib with her first pregnancy and has had episodes with each pregnancy.  Last known episode was 2011.  Patient reports over the past year she has noted fleeting episodes of palpitations lasting for just a few seconds.  She has an iWatch with telemetry capability and has noted irregular rhythm.  She has been evaluated by Dr. Crow on 10/4/2021.  She also reports some midsternal chest pressure that is worse with exertion and improves with rest.  She becomes diaphoretic and feels short of breath with episodes of increased heart rate.  Her iWatch recorded heart rate 200-210.  She was symptomatic and tried vagal maneuvers which did not improve the rate.  She became concerned so she presented to the ER.  In the ER, initial heart rate 205.  EKG showed atrial fibrillation with rate of 119.  She was given aspirin, IV Cardizem bolus and started on drip which is currently at 10 mg.  She was also given IV fluids.     Upon my evaluation, she is sitting up in bed and appears to be resting comfortably.  Heart rate is currently 107.  Patient states when she gets up to the bathroom it  does increase to 120.  She denies any recent presyncopal or syncopal episodes, no dizziness or lightheadedness.  She has had no lower extremity edema.           REVIEW OF SYSTEMS:  Pertinent items are noted in HPI, all other systems reviewed and negative     OBJECTIVE   Troponin negative  Triglycerides 366  proBNP 100.1  TSH 4.44/Free T4 0.99  Magnesium 2     ASSESSMENT  Atrial fibrillation with rapid ventricular response  History of A. fib with pregnancy  History of Bell's palsy  History of depression  Obesity     PLAN  Atrial fibrillation with rapid ventricular response  Patient has converted to sinus rhythm with intravenous Cardizem.  Patient was on labetalol.  Switch her to metoprolol 50 mg twice a day.  Patient is on Cardizem  mg a day.  Echocardiogram 6/29/2021 was normal.  Stress Cardiolite--normal 10/11/2021.  Patient was educated regarding the results of stress Cardiolite test.    Anticoagulation options were discussed with patient.  Patient will be started on Eliquis 5 mg twice a day.  Risks and benefits pros and cons were discussed with patient.    Follow-up with primary cardiologist in 2 weeks.  Have discussed with attending nurse for coordination of care.    Further plan will depend on patient's progress.  ]]]]]]]]]]]]]]]]]]             Maritza Chambers MD  10/12/21  05:34 EDT

## 2021-10-13 ENCOUNTER — TRANSITIONAL CARE MANAGEMENT TELEPHONE ENCOUNTER (OUTPATIENT)
Dept: CALL CENTER | Facility: HOSPITAL | Age: 36
End: 2021-10-13

## 2021-10-13 NOTE — CASE MANAGEMENT/SOCIAL WORK
Case Management Discharge Note      Final Note: home    Provided Post Acute Provider List?: N/A  Provided Post Acute Provider Quality & Resource List?: N/A                 Final Discharge Disposition Code: 01 - home or self-care

## 2021-10-13 NOTE — OUTREACH NOTE
Prep Survey      Responses   [facility] patient discharged from? [facility]   Is LACE score < 7 ? Yes   Emergency Room discharge w/ pulse ox? No   Eligibility TCM   H[facility]   Date of Admission 10/09/21   Date of Discharge 10/12/21   Discharge Disposition Home or Self Care   Discharge diagnosis A-fib RVR, chest pain   Does the patient have one of the following disease processes/diagnoses(primary or secondary)? Other   Does the patient have Home health ordered? No   Is there a DME ordered? No   Prep survey completed? Yes          Destiney Wolfe RN

## 2021-10-13 NOTE — OUTREACH NOTE
Call Center TCM Note      Responses   [facility] patient discharged from? [facility]   Does the patient have one of the following disease processes/diagnoses(primary or secondary)? Other   TCM attempt successful? Yes   Call start time 1421   Call end time 1423   Discharge diagnosis A-fib RVR, chest pain   Meds reviewed with patient/caregiver? Yes   Is the patient having any side effects they believe may be caused by any medication additions or changes? No   Does the patient have all medications ordered at discharge? Yes   Is the patient taking all medications as directed (includes completed medication regime)? Yes   Does the patient have a primary care provider?  Yes   Does the patient have an appointment with their PCP within 7 days of discharge? Yes   Has the patient kept scheduled appointments due by today? N/A   Comments She is going to call Dr. Crow to schedule a follow up due to afib   Has home health visited the patient within 72 hours of discharge? N/A   Psychosocial issues? No   Did the patient receive a copy of their discharge instructions? Yes   Nursing interventions Reviewed instructions with patient   What is the patient's perception of their health status since discharge? Improving   Is the patient/caregiver able to teach back signs and symptoms related to disease process for when to call PCP? Yes   Is the patient/caregiver able to teach back signs and symptoms related to disease process for when to call 911? Yes   Is the patient/caregiver able to teach back the hierarchy of who to call/visit for symptoms/problems? PCP, Specialist, Home health nurse, Urgent Care, ED, 911 Yes   Additional teach back comments States she is doing well   TCM call completed? Yes   Wrap up additional comments Denies questions or needs at this time          Sandy Daniel LPN    10/13/2021, 14:25 EDT

## 2021-10-13 NOTE — OUTREACH NOTE
Call Center TCM Note      Responses   [facility] patient discharged from? [facility]   Does the patient have one of the following disease processes/diagnoses(primary or secondary)? Other   TCM attempt successful? No   Unsuccessful attempts Attempt 1          Sandy Daniel LPN    10/13/2021, 08:35 EDT

## 2021-10-14 LAB
QT INTERVAL: 380 MS
QT INTERVAL: 394 MS

## 2021-10-19 ENCOUNTER — LAB (OUTPATIENT)
Dept: FAMILY MEDICINE CLINIC | Facility: CLINIC | Age: 36
End: 2021-10-19

## 2021-10-19 ENCOUNTER — OFFICE VISIT (OUTPATIENT)
Dept: FAMILY MEDICINE CLINIC | Facility: CLINIC | Age: 36
End: 2021-10-19

## 2021-10-19 VITALS
SYSTOLIC BLOOD PRESSURE: 129 MMHG | OXYGEN SATURATION: 96 % | TEMPERATURE: 97.1 F | WEIGHT: 242 LBS | DIASTOLIC BLOOD PRESSURE: 63 MMHG | HEART RATE: 58 BPM | BODY MASS INDEX: 34.72 KG/M2

## 2021-10-19 DIAGNOSIS — F32.A DEPRESSION, UNSPECIFIED DEPRESSION TYPE: ICD-10-CM

## 2021-10-19 DIAGNOSIS — I48.0 PAROXYSMAL ATRIAL FIBRILLATION (HCC): ICD-10-CM

## 2021-10-19 DIAGNOSIS — I48.0 PAROXYSMAL ATRIAL FIBRILLATION (HCC): Primary | ICD-10-CM

## 2021-10-19 DIAGNOSIS — R00.2 PALPITATIONS: ICD-10-CM

## 2021-10-19 DIAGNOSIS — R79.89 ABNORMAL TSH: ICD-10-CM

## 2021-10-19 LAB
ALBUMIN SERPL-MCNC: 4.5 G/DL (ref 3.5–5.2)
ALBUMIN/GLOB SERPL: 1.6 G/DL
ALP SERPL-CCNC: 78 U/L (ref 39–117)
ALT SERPL W P-5'-P-CCNC: 19 U/L (ref 1–33)
ANION GAP SERPL CALCULATED.3IONS-SCNC: 10.1 MMOL/L (ref 5–15)
AST SERPL-CCNC: 18 U/L (ref 1–32)
BILIRUB SERPL-MCNC: 0.8 MG/DL (ref 0–1.2)
BUN SERPL-MCNC: 10 MG/DL (ref 6–20)
BUN/CREAT SERPL: 12.3 (ref 7–25)
CALCIUM SPEC-SCNC: 9.8 MG/DL (ref 8.6–10.5)
CHLORIDE SERPL-SCNC: 101 MMOL/L (ref 98–107)
CO2 SERPL-SCNC: 27.9 MMOL/L (ref 22–29)
CREAT SERPL-MCNC: 0.81 MG/DL (ref 0.57–1)
GFR SERPL CREATININE-BSD FRML MDRD: 80 ML/MIN/1.73
GLOBULIN UR ELPH-MCNC: 2.8 GM/DL
GLUCOSE SERPL-MCNC: 113 MG/DL (ref 65–99)
POTASSIUM SERPL-SCNC: 4.8 MMOL/L (ref 3.5–5.2)
PROT SERPL-MCNC: 7.3 G/DL (ref 6–8.5)
SODIUM SERPL-SCNC: 139 MMOL/L (ref 136–145)
T4 FREE SERPL-MCNC: 1 NG/DL (ref 0.93–1.7)
TSH SERPL DL<=0.05 MIU/L-ACNC: 2.16 UIU/ML (ref 0.27–4.2)

## 2021-10-19 PROCEDURE — 36415 COLL VENOUS BLD VENIPUNCTURE: CPT | Performed by: FAMILY MEDICINE

## 2021-10-19 PROCEDURE — 80053 COMPREHEN METABOLIC PANEL: CPT | Performed by: FAMILY MEDICINE

## 2021-10-19 PROCEDURE — 84439 ASSAY OF FREE THYROXINE: CPT | Performed by: FAMILY MEDICINE

## 2021-10-19 PROCEDURE — 99496 TRANSJ CARE MGMT HIGH F2F 7D: CPT | Performed by: FAMILY MEDICINE

## 2021-10-19 PROCEDURE — 84443 ASSAY THYROID STIM HORMONE: CPT | Performed by: FAMILY MEDICINE

## 2021-10-19 RX ORDER — ESCITALOPRAM OXALATE 10 MG/1
10 TABLET ORAL DAILY
Qty: 90 TABLET | Refills: 1 | Status: SHIPPED | OUTPATIENT
Start: 2021-10-19 | End: 2022-07-13 | Stop reason: SDUPTHER

## 2021-10-19 NOTE — ASSESSMENT & PLAN NOTE
She remains in sinus rhythm for now.  She plans to follow up with Dr. Crow soon.  I will defer to him in regards to whether or not she continues with the Eliquis.

## 2021-10-19 NOTE — PROGRESS NOTES
Transitional Care Follow Up Visit  Subjective     Tania Wolfe is a 35 y.o. female who presents for a transitional care management visit.    Within 48 business hours after discharge our office contacted her via telephone to coordinate her care and needs.      I reviewed and discussed the details of that call along with the discharge summary, hospital problems, inpatient lab results, inpatient diagnostic studies, and consultation reports with Tania.     Current outpatient and discharge medications have been reconciled for the patient.  Reviewed by: Madison Cruz MD      Date of TCM Phone Call 10/12/2021   [facility]   Date of Admission 10/9/2021   Date of Discharge 10/12/2021   Discharge Disposition Home or Self Care     Risk for Readmission (LACE) Score: 6 (10/12/2021  6:00 AM)      History of Present Illness   Course During Hospital Stay:  Patient is a 35-year-old female with history of paroxysmal atrial fibrillation-on Cardizem and labetalol.  Presented to [facility] on 10/9/2021 with complaints of palpitation, chest pain, diaphoresis and nausea. In the ED patient was noted to have atrial fibrillation with rapid ventricular response and was given IV Cardizem bolus and started on Cardizem drip She was admitted for further care.     Patient has converted back to normal sinus rhythm and rate controlled  TSH mildly elevated but free T4 within normal limits  Cardiologist consulted and recommended Myoview stress test which was done on 10/11/2021.  Myoview stress test-normal  2D echo-nonrevealing  Labetalol was changed to metoprolol 50 mg twice daily.  Patient was continued on Cardizem  mg daily.  She was also started on Eliquis 5 mg twice daily.  Patient to follow-up with primary cardiologist in 2 weeks     The following portions of the patient's history were reviewed and updated as appropriate: allergies, current medications, past family history, past medical history, past social history,  past surgical history and problem list.    Review of Systems    Objective   Physical Exam  Vitals and nursing note reviewed.   Constitutional:       Appearance: Normal appearance.   Cardiovascular:      Rate and Rhythm: Normal rate and regular rhythm.      Heart sounds: Normal heart sounds.   Pulmonary:      Effort: Pulmonary effort is normal.      Breath sounds: Normal breath sounds.   Musculoskeletal:      Cervical back: Normal range of motion and neck supple.      Right lower leg: No edema.      Left lower leg: No edema.   Skin:     General: Skin is warm.   Neurological:      Mental Status: She is alert.   Psychiatric:         Mood and Affect: Mood normal.         Assessment/Plan   Diagnoses and all orders for this visit:    1. Paroxysmal atrial fibrillation (HCC) (Primary)  Assessment & Plan:  She remains in sinus rhythm for now.  She plans to follow up with Dr. Crow soon.  I will defer to him in regards to whether or not she continues with the Eliquis.     Orders:  -     TSH  -     T4, free; Future  -     Comprehensive Metabolic Panel    2. Palpitations  -     TSH  -     T4, free; Future  -     Comprehensive Metabolic Panel    3. Abnormal TSH  Assessment & Plan:  Repeat labs as ordered.    Orders:  -     TSH  -     T4, free; Future  -     Comprehensive Metabolic Panel    4. Depression, unspecified depression type  Assessment & Plan:  She is weaning down on Lexapro.      Other orders  -     escitalopram (Lexapro) 10 MG tablet; Take 1 tablet by mouth Daily.  Dispense: 90 tablet; Refill: 1

## 2021-10-22 RX ORDER — METOPROLOL TARTRATE 50 MG/1
50 TABLET, FILM COATED ORAL EVERY 12 HOURS SCHEDULED
Qty: 30 TABLET | Refills: 0 | Status: CANCELLED | OUTPATIENT
Start: 2021-10-22

## 2021-10-23 RX ORDER — METOPROLOL TARTRATE 50 MG/1
50 TABLET, FILM COATED ORAL EVERY 12 HOURS SCHEDULED
Qty: 180 TABLET | Refills: 0 | Status: SHIPPED | OUTPATIENT
Start: 2021-10-23 | End: 2022-01-28 | Stop reason: SDUPTHER

## 2021-11-04 ENCOUNTER — OFFICE VISIT (OUTPATIENT)
Dept: CARDIOLOGY | Facility: CLINIC | Age: 36
End: 2021-11-04

## 2021-11-04 VITALS
BODY MASS INDEX: 34.65 KG/M2 | HEART RATE: 85 BPM | SYSTOLIC BLOOD PRESSURE: 120 MMHG | WEIGHT: 242 LBS | OXYGEN SATURATION: 98 % | DIASTOLIC BLOOD PRESSURE: 77 MMHG | HEIGHT: 70 IN

## 2021-11-04 DIAGNOSIS — E78.5 DYSLIPIDEMIA: ICD-10-CM

## 2021-11-04 DIAGNOSIS — I48.0 PAROXYSMAL ATRIAL FIBRILLATION (HCC): Primary | ICD-10-CM

## 2021-11-04 DIAGNOSIS — R00.2 PALPITATIONS: ICD-10-CM

## 2021-11-04 PROCEDURE — 93000 ELECTROCARDIOGRAM COMPLETE: CPT | Performed by: INTERNAL MEDICINE

## 2021-11-04 PROCEDURE — 99213 OFFICE O/P EST LOW 20 MIN: CPT | Performed by: INTERNAL MEDICINE

## 2021-11-04 NOTE — PROGRESS NOTES
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  Patient reportedly had A. fib during pregnancy in 2008 and repeat in 2011.  States she had  documented A. fib in 10/14   She has additional history of essential hypertension  She complains of ectopic beats  No syncope  No af since discharge      Past Medical History:   Diagnosis Date   • Atrial fibrillation (HCC)     during pregnancies   • Bell palsy 01/21/2016   • Depression      Past Surgical History:   Procedure Laterality Date   • BREAST SURGERY  2018    breast reduction   • FIBULA SOFT TISSUE BIOPSY  1999    cyst removed       Review of Systems   General:  no fatigue and tiredness  Eyes: No redness  Cardiovascular: No chest pain, + for  palpitations        Physical Exam  VITALS REVIEWED    General:      well developed, well nourished, in no acute distress.    Head:      normocephalic and atraumatic.    Eyes:      PERRL/EOM intact, conjunctiva and sclera clear with out nystagmus.    Neck:      no masses, thyromegaly,  trachea central with normal respiratory effort and PMI displaced laterally  Lungs:      clear bilaterally to auscultation.    Heart:Sinus rhythm without any murmurs or gallops              Assessment:    Recent labs revealed normal magnesium, normal potassium 4.1 normal TSH  Palpitations consistent with Pac's--continue smart watch recordings  Fatigue  Hyperglycemia, A1c of 5.8 suggestive of prediabetes  Paroxysmal A. Fib  Dyslipidemia with low HDL--PCP following  Patient had echocardiogram done 6/29/2021 which revealed normal LV systolic function  Patient reassured  Options for AF educated  CHADSVASC score-1  Stop eliquis    ECG 12 Lead    Date/Time: 11/4/2021 4:11 PM  Performed by: John Crow MD  Authorized by:  John Crow MD   Comparison: compared with previous ECG   Similar to previous ECG  Rhythm: sinus rhythm  Rate: normal  Conduction: conduction normal  QRS axis: normal              Electronically signed by John Crow MD, 11/04/21, 4:11 PM EDT.

## 2022-01-27 RX ORDER — METOPROLOL TARTRATE 50 MG/1
50 TABLET, FILM COATED ORAL EVERY 12 HOURS SCHEDULED
Qty: 180 TABLET | Refills: 0 | Status: CANCELLED | OUTPATIENT
Start: 2022-01-27

## 2022-01-28 ENCOUNTER — TELEPHONE (OUTPATIENT)
Dept: CARDIOLOGY | Facility: CLINIC | Age: 37
End: 2022-01-28

## 2022-01-28 RX ORDER — METOPROLOL TARTRATE 50 MG/1
50 TABLET, FILM COATED ORAL EVERY 12 HOURS SCHEDULED
Qty: 180 TABLET | Refills: 1 | Status: SHIPPED | OUTPATIENT
Start: 2022-01-28 | End: 2022-08-01 | Stop reason: SDUPTHER

## 2022-01-28 RX ORDER — METOPROLOL TARTRATE 50 MG/1
50 TABLET, FILM COATED ORAL EVERY 12 HOURS SCHEDULED
Qty: 180 TABLET | Refills: 0 | Status: CANCELLED | OUTPATIENT
Start: 2022-01-28

## 2022-01-28 RX ORDER — METOPROLOL TARTRATE 50 MG/1
50 TABLET, FILM COATED ORAL EVERY 12 HOURS SCHEDULED
Qty: 180 TABLET | Refills: 0 | Status: CANCELLED | OUTPATIENT
Start: 2022-01-27

## 2022-01-28 RX ORDER — METOPROLOL TARTRATE 50 MG/1
50 TABLET, FILM COATED ORAL EVERY 12 HOURS SCHEDULED
Qty: 180 TABLET | Refills: 0 | Status: SHIPPED | OUTPATIENT
Start: 2022-01-28 | End: 2022-01-28 | Stop reason: SDUPTHER

## 2022-01-28 NOTE — TELEPHONE ENCOUNTER
Incoming Refill Request      Medication requested (name and dose):Metoprolol 50 mg    Pharmacy where request should be sent: [facility]     Additional details provided by patient: PCP usually takes care of this medication, but she is out of office. Patient has been out of metoprolol for 3 days. Can we refill it for her?     Best call back number:[phone]    Does the patient have less than a 3 day supply:  [x] Yes  [] No    Montez Alcaraz Rep  01/28/22, 12:55 EST

## 2022-03-07 ENCOUNTER — TELEMEDICINE (OUTPATIENT)
Dept: FAMILY MEDICINE CLINIC | Facility: TELEHEALTH | Age: 37
End: 2022-03-07

## 2022-03-07 ENCOUNTER — LAB (OUTPATIENT)
Dept: LAB | Facility: HOSPITAL | Age: 37
End: 2022-03-07

## 2022-03-07 DIAGNOSIS — Z20.822 SUSPECTED COVID-19 VIRUS INFECTION: Primary | ICD-10-CM

## 2022-03-07 DIAGNOSIS — Z20.822 SUSPECTED COVID-19 VIRUS INFECTION: ICD-10-CM

## 2022-03-07 LAB — SARS-COV-2 ORF1AB RESP QL NAA+PROBE: NOT DETECTED

## 2022-03-07 PROCEDURE — C9803 HOPD COVID-19 SPEC COLLECT: HCPCS

## 2022-03-07 PROCEDURE — BHEMPVIDEOVISIT: Performed by: NURSE PRACTITIONER

## 2022-03-07 PROCEDURE — U0004 COV-19 TEST NON-CDC HGH THRU: HCPCS

## 2022-03-07 NOTE — PATIENT INSTRUCTIONS
"Go to the [facility] for your Covid-19 test. Wear a mask. When you arrive, notify the staff that you have done a virtual visit and have a covid test ordered. You will not need to be seen again by a provider. You will be notified in 1-5 days about the results of your test, by telephone call from a blocked cell number, and via [employer].    For [facility] [employer] [facility] Employee's undergoing COVID-19 testing: Have your COVID test done within 24-48 hours of failing the screening tool. Contact [facility] at [phone] or [phone]. Leave a VM with your full name, employee ID, , exact details of symptoms or exposure. You will receive a call back within 24-72 hours with further info.      While you are waiting for your results, you should Self-Quarantine.      If your test is Negative: Complete the \"Return to Work Survey\" from [facility] (found on KELSIE) to return to work.     If your test is Positive: Call [facility] immediately to inform of the positive result. Self-Quarantine until you are deemed no longer contagious (10 days from symptom onset). Complete the \"Return to Work Survey\" found on KELSIE to report your Positive test and return to the \"Return to Work Survey\" on KELSIE within 24 hours of your anticipated return date in order to seek official clearance to return to work.    Push fluids, rest  Tylenol/ibuprofen for pain or fever>101  Saline nasal spray/irrigation, humidified air for sinus symptoms  Flonase, mucinex with a full glass of water for congestion  Do not suppress your cough unless it prevents you from resting  Follow up if symptoms worsen or do not improve in 1 week.    "

## 2022-03-07 NOTE — PROGRESS NOTES
Subjective   Tania Wolfe is a 36 y.o. female.     [employer] employee, got a red X on pre-work screen. Her symptoms started two days ago with a headache, relieved by excedrin. Yesterday she had a headache, felt weak, tired body aches. Today she had loss of voice, congestion. She did an at-home covid test which was negative.        The following portions of the patient's history were reviewed and updated as appropriate: allergies, current medications, past family history, past medical history, past social history, past surgical history, and problem list.    Review of Systems   Constitutional: Positive for chills, diaphoresis and fatigue.   HENT: Positive for congestion, rhinorrhea, sinus pressure, sore throat and voice change.    Musculoskeletal: Positive for myalgias.   Neurological: Positive for weakness and headache.       Objective   Physical Exam  Constitutional:       General: She is not in acute distress.     Appearance: She is well-developed. She is not diaphoretic.   Pulmonary:      Effort: Pulmonary effort is normal.   Neurological:      Mental Status: She is alert and oriented to person, place, and time.   Psychiatric:         Behavior: Behavior normal.           Assessment/Plan   Diagnoses and all orders for this visit:    1. Suspected COVID-19 virus infection (Primary)  -     COVID-19,APTIMA PANTHER(ROBERTO), MARISSA/ LIANE, NP/OP SWAB IN UTM/VTM/SALINE TRANSPORT MEDIA,24 HR TAT - Swab, Nasopharynx; Future                 The use of a video visit has been reviewed with the patient and verbal informed consent has been obtained. Myself and Tania Wolfe participated in this visit. The patient is located in [facility] [place]. I am located in [place], [place]. Mychart and Zoom were utilized.

## 2022-06-21 ENCOUNTER — HOSPITAL ENCOUNTER (OUTPATIENT)
Dept: CARDIOLOGY | Facility: HOSPITAL | Age: 37
Discharge: HOME | End: 2022-06-21

## 2022-06-21 DIAGNOSIS — I48.0 PAROXYSMAL ATRIAL FIBRILLATION: ICD-10-CM

## 2022-06-21 DIAGNOSIS — R00.2 PALPITATIONS: Primary | ICD-10-CM

## 2022-06-21 DIAGNOSIS — R00.2 PALPITATIONS: ICD-10-CM

## 2022-06-21 LAB
MAXIMAL PREDICTED HEART RATE: 184 BPM
STRESS TARGET HR: 156 BPM

## 2022-06-24 ENCOUNTER — APPOINTMENT (OUTPATIENT)
Dept: RESPIRATORY THERAPY | Facility: HOSPITAL | Age: 37
End: 2022-06-24

## 2022-07-13 RX ORDER — ESCITALOPRAM OXALATE 10 MG/1
10 TABLET ORAL DAILY
Qty: 90 TABLET | Refills: 1 | Status: SHIPPED | OUTPATIENT
Start: 2022-07-13 | End: 2023-01-12 | Stop reason: SDUPTHER

## 2022-08-01 RX ORDER — METOPROLOL TARTRATE 50 MG/1
50 TABLET, FILM COATED ORAL EVERY 12 HOURS SCHEDULED
Qty: 180 TABLET | Refills: 1 | Status: SHIPPED | OUTPATIENT
Start: 2022-08-01 | End: 2023-02-03 | Stop reason: SDUPTHER

## 2022-09-13 ENCOUNTER — LAB (OUTPATIENT)
Dept: LAB | Facility: HOSPITAL | Age: 37
End: 2022-09-13

## 2022-09-13 ENCOUNTER — OFFICE VISIT (OUTPATIENT)
Dept: CARDIOLOGY | Facility: CLINIC | Age: 37
End: 2022-09-13

## 2022-09-13 VITALS
SYSTOLIC BLOOD PRESSURE: 106 MMHG | DIASTOLIC BLOOD PRESSURE: 70 MMHG | HEART RATE: 71 BPM | OXYGEN SATURATION: 99 % | BODY MASS INDEX: 32.64 KG/M2 | HEIGHT: 70 IN | WEIGHT: 228 LBS

## 2022-09-13 DIAGNOSIS — I48.0 PAROXYSMAL ATRIAL FIBRILLATION: ICD-10-CM

## 2022-09-13 DIAGNOSIS — R00.2 PALPITATIONS: ICD-10-CM

## 2022-09-13 DIAGNOSIS — I10 ESSENTIAL HYPERTENSION: ICD-10-CM

## 2022-09-13 DIAGNOSIS — R73.9 HYPERGLYCEMIA: ICD-10-CM

## 2022-09-13 DIAGNOSIS — E66.9 OBESITY (BMI 30-39.9): ICD-10-CM

## 2022-09-13 DIAGNOSIS — E78.5 DYSLIPIDEMIA: ICD-10-CM

## 2022-09-13 DIAGNOSIS — R00.1 BRADYCARDIA: Primary | ICD-10-CM

## 2022-09-13 DIAGNOSIS — R00.1 BRADYCARDIA: ICD-10-CM

## 2022-09-13 LAB
ALBUMIN SERPL-MCNC: 4.7 G/DL (ref 3.5–5.2)
ALBUMIN/GLOB SERPL: 1.8 G/DL
ALP SERPL-CCNC: 89 U/L (ref 39–117)
ALT SERPL W P-5'-P-CCNC: 9 U/L (ref 1–33)
ANION GAP SERPL CALCULATED.3IONS-SCNC: 9.7 MMOL/L (ref 5–15)
AST SERPL-CCNC: 12 U/L (ref 1–32)
BILIRUB SERPL-MCNC: 1.1 MG/DL (ref 0–1.2)
BUN SERPL-MCNC: 8 MG/DL (ref 6–20)
BUN/CREAT SERPL: 10.4 (ref 7–25)
CALCIUM SPEC-SCNC: 9.6 MG/DL (ref 8.6–10.5)
CHLORIDE SERPL-SCNC: 102 MMOL/L (ref 98–107)
CHOLEST SERPL-MCNC: 206 MG/DL (ref 0–200)
CO2 SERPL-SCNC: 29.3 MMOL/L (ref 22–29)
CREAT SERPL-MCNC: 0.77 MG/DL (ref 0.57–1)
EGFRCR SERPLBLD CKD-EPI 2021: 102.7 ML/MIN/1.73
GLOBULIN UR ELPH-MCNC: 2.6 GM/DL
GLUCOSE SERPL-MCNC: 110 MG/DL (ref 65–99)
HBA1C MFR BLD: 5.1 % (ref 3.5–5.6)
HDLC SERPL-MCNC: 42 MG/DL (ref 40–60)
LDLC SERPL CALC-MCNC: 127 MG/DL (ref 0–100)
LDLC/HDLC SERPL: 2.9 {RATIO}
POTASSIUM SERPL-SCNC: 4.2 MMOL/L (ref 3.5–5.2)
PROT SERPL-MCNC: 7.3 G/DL (ref 6–8.5)
SODIUM SERPL-SCNC: 141 MMOL/L (ref 136–145)
TRIGL SERPL-MCNC: 210 MG/DL (ref 0–150)
TSH SERPL DL<=0.05 MIU/L-ACNC: 2.41 UIU/ML (ref 0.27–4.2)
VLDLC SERPL-MCNC: 37 MG/DL (ref 5–40)

## 2022-09-13 PROCEDURE — 93000 ELECTROCARDIOGRAM COMPLETE: CPT | Performed by: INTERNAL MEDICINE

## 2022-09-13 PROCEDURE — 83036 HEMOGLOBIN GLYCOSYLATED A1C: CPT

## 2022-09-13 PROCEDURE — 80061 LIPID PANEL: CPT

## 2022-09-13 PROCEDURE — 84443 ASSAY THYROID STIM HORMONE: CPT

## 2022-09-13 PROCEDURE — 99214 OFFICE O/P EST MOD 30 MIN: CPT | Performed by: INTERNAL MEDICINE

## 2022-09-13 PROCEDURE — 80053 COMPREHEN METABOLIC PANEL: CPT

## 2022-09-13 PROCEDURE — 36415 COLL VENOUS BLD VENIPUNCTURE: CPT

## 2022-09-13 NOTE — PROGRESS NOTES
Subjective:     Encounter Date:09/13/2022      Patient ID: Tania Wolfe is a 36 y.o. female.    Chief Complaint : Follow-up for palpitations, A. 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  Patient reportedly had A. fib during pregnancy in 2008 and repeat in 2011.  States she had no documented A. fib since 2011, till recently started having infrequent palpitations.  Now patient has a iWatch which shows a rhythm strip and when she is having palpitations she has printout showing A. fib but it lasts less than 10 beats at the time.  Patient feels keyed beats and dizzy lightheaded and dizzy.  Recently went to the ER with shortness of breath.    Patient's arterial blood pressure is 106/70, heart rate 71 bpm O2 sat of 99% on room air.  BMI is over 30    Labs from August 2020 revealed normal TSH, T3, free T4, CMP with a glucose elevated at 132, lipid profile with cholesterol 181 triglycerides 270 HDL low at 31 LDL 96.  Labs from 6/3/2021 revealed TSH normal at 2.07, normal CMP normal magnesium.  A1c of 5.8.  10/19/2021 labs reveal normal CMP except for elevated glucose.  Normal CBC and TSH.      Assessment:      Palpitations  Bradycardia  Shortness of breath  Hyperglycemia, A1c of 5.8 suggestive of prediabetes  Paroxysmal A. Fib  Dyslipidemia with low HDL      Recommendations / Plan:        Patient is having EKG showing heart rate of 48 bpm in the ER.  Will discontinue diltiazem.  Will check labs.  Patient had echocardiogram done 6/29/2021 which revealed normal LV systolic function  Patient's [id number] score is low, therefore we will hold off on anticoagulation.  Reviewed  low HDL counseled on walking exercise.  Reviewed BMI over 30, counseled on weight loss diet and exercise.  We will continue metoprolol tartrate as tolerated for paroxysmal A. fib.  Discussed about ablation and anticoagulation with patient.  If she develops hypertension or diabetes we will put her on long-term anticoagulation.  We will follow-up in 1 year or sooner if needed.            ECG 12 Lead    Date/Time: 9/13/2022 4:17 PM  Performed by: Erlin Rahman MD  Authorized by: Erlin Rahman MD   Comparison: compared with previous ECG from 11/4/2021  Comparison to previous ECG: EKG done today reviewed/interpreted by me reveals sinus bradycardia at the rate of 48 bpm, no new change compared EKG from 11/4/2021              Copied text in this portion of the note has been reviewed and is accurate as of 9/13/2022  The following portions of the patient's history were reviewed and updated as appropriate: allergies, current medications, past family history, past medical history, past social history, past surgical history and problem list.    Assessment:         MDM     Diagnosis Plan   1. Bradycardia  Comprehensive Metabolic Panel    Lipid Panel    TSH    Hemoglobin A1c   2. Palpitations  Comprehensive Metabolic Panel    Lipid Panel    TSH    Hemoglobin A1c   3. Paroxysmal atrial fibrillation (HCC)  Comprehensive Metabolic Panel    Lipid Panel    TSH    Hemoglobin A1c   4. Dyslipidemia  Comprehensive Metabolic Panel    Lipid Panel    TSH    Hemoglobin A1c   5. Essential hypertension  Comprehensive Metabolic Panel    Lipid Panel    TSH    Hemoglobin A1c   6. Obesity (BMI 30-39.9)  Comprehensive Metabolic Panel    Lipid Panel    TSH    Hemoglobin A1c   7. Hyperglycemia  Comprehensive Metabolic Panel    Lipid Panel    TSH    Hemoglobin A1c          Plan:               Past Medical History:  Past Medical History:   Diagnosis Date   • Atrial fibrillation (HCC)     during pregnancies   • Bell palsy  "01/21/2016   • Depression      Past Surgical History:  Past Surgical History:   Procedure Laterality Date   • BREAST SURGERY  2018    breast reduction   • FIBULA SOFT TISSUE BIOPSY  1999    cyst removed      Allergies:  No Known Allergies  Home Meds:  Current Meds:     Current Outpatient Medications:   •  escitalopram (Lexapro) 10 MG tablet, Take 1 tablet by mouth Daily., Disp: 90 tablet, Rfl: 1  •  metoprolol tartrate (LOPRESSOR) 50 MG tablet, Take 1 tablet by mouth Every 12 (Twelve) Hours., Disp: 180 tablet, Rfl: 1  •  Multiple Vitamin (MULTIVITAMIN) capsule, Take 1 capsule by mouth Daily., Disp: , Rfl:   Social History:   Social History     Tobacco Use   • Smoking status: Never Smoker   • Smokeless tobacco: Never Used   Substance Use Topics   • Alcohol use: Yes     Comment: occ      Family History:  Family History   Adopted: Yes   Problem Relation Age of Onset   • Heart disease Mother    • Thyroid disease Father    • Diabetes Father               Review of Systems   Constitutional: Negative for malaise/fatigue.   Cardiovascular: Negative for chest pain, leg swelling and palpitations.   Respiratory: Negative for shortness of breath.    Skin: Negative for rash.   Neurological: Negative for dizziness, light-headedness and numbness.     All other systems are negative         Objective:     Physical Exam  /70   Pulse 71   Ht 177.8 cm (70\")   Wt 103 kg (228 lb)   SpO2 99%   BMI 32.71 kg/m²   General:  Appears in no acute distress  Eyes: Sclera is anicteric,  conjunctiva is clear   HEENT:  No JVD.  No carotid bruits  Respiratory: Respirations regular and unlabored at rest.  Clear to auscultation  Cardiovascular: S1,S2 Regular rate and rhythm. No murmur, rub or gallop auscultated.   Extremities: No digital clubbing or cyanosis, no edema  Skin: Color pink. Skin warm and dry to touch. No rashes  No xanthoma  Neuro: Alert and awake.    Lab Reviewed:         Erlin Rahman MD  9/13/2022 11:22 " "EDT      EMR Dragon/Transcription:   \"Dictated utilizing Dragon dictation\".        "

## 2022-09-24 ENCOUNTER — PATIENT MESSAGE (OUTPATIENT)
Dept: CARDIOLOGY | Facility: CLINIC | Age: 37
End: 2022-09-24

## 2022-09-25 ENCOUNTER — TELEPHONE (OUTPATIENT)
Dept: CARDIOLOGY | Facility: CLINIC | Age: 37
End: 2022-09-25

## 2022-09-25 NOTE — TELEPHONE ENCOUNTER
Please Advise.     ----- Message from Tania Wolfe sent at 9/24/2022  2:00 PM EDT -----  Regarding: Cardizem    Since stopping the Cardizem I’ve noticed my pulse increasing over 100 more often and I feel it skipping beats a lot more frequently. Today I’m at work and I feel off - my pulse is 88, BP is 121/76 and I’ve attached a reading from my watch. I didn’t know if I should go back on Cardizem or something else.   Patient Entered Attachment - [id number] (09/24/2022)    Tania

## 2022-09-27 RX ORDER — DILTIAZEM HYDROCHLORIDE 120 MG/1
120 CAPSULE, COATED, EXTENDED RELEASE ORAL DAILY
Qty: 90 CAPSULE | Refills: 1 | Status: SHIPPED | OUTPATIENT
Start: 2022-09-27 | End: 2023-04-04 | Stop reason: SDUPTHER

## 2022-09-27 NOTE — TELEPHONE ENCOUNTER
Rx Refill Note  Requested Prescriptions      No prescriptions requested or ordered in this encounter      Last office visit with prescribing clinician: 9/13/2022      Next office visit with prescribing clinician: 9/13/2023            Preethi Ignacio MA  09/27/22, 13:39 EDT

## 2023-01-13 RX ORDER — ESCITALOPRAM OXALATE 10 MG/1
10 TABLET ORAL DAILY
Qty: 90 TABLET | Refills: 0 | Status: SHIPPED | OUTPATIENT
Start: 2023-01-13 | End: 2023-04-04 | Stop reason: SDUPTHER

## 2023-01-13 NOTE — TELEPHONE ENCOUNTER
I refilled her medicine but it has been over a year since she has been to the office.  She will need an appointment to see me before any more refills.

## 2023-01-16 NOTE — TELEPHONE ENCOUNTER
The pt was verbally informed and understood she stated she will call in before she needs another refill to schedule her physical

## 2023-01-31 RX ORDER — METOPROLOL TARTRATE 50 MG/1
50 TABLET, FILM COATED ORAL EVERY 12 HOURS SCHEDULED
Qty: 180 TABLET | Refills: 1 | OUTPATIENT
Start: 2023-01-31

## 2023-02-02 RX ORDER — METOPROLOL TARTRATE 50 MG/1
50 TABLET, FILM COATED ORAL EVERY 12 HOURS SCHEDULED
Qty: 180 TABLET | Refills: 1 | OUTPATIENT
Start: 2023-02-02

## 2023-02-02 RX ORDER — METOPROLOL TARTRATE 50 MG/1
50 TABLET, FILM COATED ORAL EVERY 12 HOURS SCHEDULED
Qty: 180 TABLET | Refills: 1 | Status: CANCELLED | OUTPATIENT
Start: 2023-02-02

## 2023-02-03 ENCOUNTER — TELEPHONE (OUTPATIENT)
Dept: CARDIOLOGY | Facility: CLINIC | Age: 38
End: 2023-02-03
Payer: COMMERCIAL

## 2023-02-03 RX ORDER — METOPROLOL TARTRATE 50 MG/1
50 TABLET, FILM COATED ORAL EVERY 12 HOURS SCHEDULED
Qty: 180 TABLET | Refills: 1 | Status: SHIPPED | OUTPATIENT
Start: 2023-02-03

## 2023-02-03 NOTE — TELEPHONE ENCOUNTER
Rx Refill Note  Requested Prescriptions     Pending Prescriptions Disp Refills   • metoprolol tartrate (LOPRESSOR) 50 MG tablet 180 tablet 1     Sig: Take 1 tablet by mouth Every 12 (Twelve) Hours.      Last office visit with prescribing clinician: 9/13/2022     Next office visit with prescribing clinician: 9/13/2023                     {  Chantale Cash MA  02/03/23, 15:19 EST

## 2023-04-04 RX ORDER — ESCITALOPRAM OXALATE 10 MG/1
10 TABLET ORAL DAILY
Qty: 90 TABLET | Refills: 0 | Status: SHIPPED | OUTPATIENT
Start: 2023-04-04

## 2023-04-04 RX ORDER — DILTIAZEM HYDROCHLORIDE 120 MG/1
120 CAPSULE, COATED, EXTENDED RELEASE ORAL DAILY
Qty: 90 CAPSULE | Refills: 1 | Status: CANCELLED | OUTPATIENT
Start: 2023-04-04

## 2023-04-04 NOTE — TELEPHONE ENCOUNTER
Caller: Tania Wolfe LESLIE    Relationship: Self    Best call back number: [phone]    Requested Prescriptions:   Requested Prescriptions     Pending Prescriptions Disp Refills   • escitalopram (Lexapro) 10 MG tablet 90 tablet 0     Sig: Take 1 tablet by mouth Daily.        Pharmacy where request should be sent: [facility]     Last office visit with prescribing clinician: 10/19/2021   Last telemedicine visit with prescribing clinician: 4/5/2023   Next office visit with prescribing clinician: 4/5/2023     Additional details provided by patient:     Does the patient have less than a 3 day supply:  [x] Yes  [] No    Would you like a call back once the refill request has been completed: [] Yes [x] No    If the office needs to give you a call back, can they leave a voicemail: [] Yes [] No    Montez Pond Rep   04/04/23 09:24 EDT

## 2023-04-05 ENCOUNTER — OFFICE VISIT (OUTPATIENT)
Dept: FAMILY MEDICINE CLINIC | Facility: CLINIC | Age: 38
End: 2023-04-05
Payer: COMMERCIAL

## 2023-04-05 VITALS
DIASTOLIC BLOOD PRESSURE: 84 MMHG | OXYGEN SATURATION: 98 % | SYSTOLIC BLOOD PRESSURE: 129 MMHG | BODY MASS INDEX: 34.22 KG/M2 | HEART RATE: 68 BPM | TEMPERATURE: 97.8 F | WEIGHT: 239 LBS | HEIGHT: 70 IN

## 2023-04-05 DIAGNOSIS — Z00.00 WELLNESS EXAMINATION: Primary | ICD-10-CM

## 2023-04-05 PROBLEM — U07.1 COVID-19 VIRUS INFECTION: Status: ACTIVE | Noted: 2022-12-01

## 2023-04-05 PROCEDURE — 99395 PREV VISIT EST AGE 18-39: CPT | Performed by: FAMILY MEDICINE

## 2023-04-05 RX ORDER — DILTIAZEM HYDROCHLORIDE 120 MG/1
120 CAPSULE, COATED, EXTENDED RELEASE ORAL DAILY
Qty: 90 CAPSULE | Refills: 1 | Status: SHIPPED | OUTPATIENT
Start: 2023-04-05

## 2023-04-05 NOTE — TELEPHONE ENCOUNTER
Rx Refill Note  Requested Prescriptions     Pending Prescriptions Disp Refills   • dilTIAZem CD (CARDIZEM CD) 120 MG 24 hr capsule 90 capsule 1     Sig: Take 1 capsule by mouth Daily.      Last office visit with prescribing clinician: 9/13/2022     Next office visit with prescribing clinician: 9/13/2023                     {    Chantale Cash MA  04/05/23, 08:12 EDT

## 2023-04-05 NOTE — PROGRESS NOTES
"Nuno Wolfe is a 37 y.o. female and is here for a comprehensive physical exam. The patient reports no problems.  Feels comfortable staying on Lexapro.    Do you take any herbs or supplements that were not prescribed by a doctor? no  Are you taking calcium supplements? no  Are you taking aspirin daily? no    The following portions of the patient's history were reviewed and updated as appropriate: allergies, current medications, past family history, past medical history, past social history, past surgical history and problem list.    Review of Systems  Do you have pain that bothers you in your daily life? not asked  Pertinent items are noted in HPI.    Objective   /84 (BP Location: Right arm, Patient Position: Sitting, Cuff Size: Large Adult)   Pulse 68   Temp 97.8 °F (36.6 °C) (Infrared)   Ht 177.8 cm (70\")   Wt 108 kg (239 lb)   SpO2 98%   BMI 34.29 kg/m²   General appearance: alert, appears stated age and cooperative  Head: Normocephalic, without obvious abnormality, atraumatic  Eyes: conjunctivae/corneas clear. PERRL, EOM's intact. Fundi benign.  Ears: abnormal TM right ear - serous middle ear fluid  Throat: lips, mucosa, and tongue normal; teeth and gums normal  Neck: no adenopathy, no JVD, supple, symmetrical, trachea midline and thyroid not enlarged, symmetric, no tenderness/mass/nodules  Lungs: clear to auscultation bilaterally  Heart: regular rate and rhythm, S1, S2 normal, no murmur, click, rub or gallop  Abdomen: soft, non-tender; bowel sounds normal; no masses,  no organomegaly  Extremities: extremities normal, atraumatic, no cyanosis or edema  Pulses: 2+ and symmetric  Skin: Skin color, texture, turgor normal. No rashes or lesions  Lymph nodes: Cervical, supraclavicular, and axillary nodes normal.  Neurologic: Grossly normal     No visits with results within 1 Week(s) from this visit.   Latest known visit with results is:   Lab on 09/13/2022   Component Date Value Ref Range " Status   • Glucose 09/13/2022 110 (H)  65 - 99 mg/dL Final   • BUN 09/13/2022 8  6 - 20 mg/dL Final   • Creatinine 09/13/2022 0.77  0.57 - 1.00 mg/dL Final   • Sodium 09/13/2022 141  136 - 145 mmol/L Final   • Potassium 09/13/2022 4.2  3.5 - 5.2 mmol/L Final   • Chloride 09/13/2022 102  98 - 107 mmol/L Final   • CO2 09/13/2022 29.3 (H)  22.0 - 29.0 mmol/L Final   • Calcium 09/13/2022 9.6  8.6 - 10.5 mg/dL Final   • Total Protein 09/13/2022 7.3  6.0 - 8.5 g/dL Final   • Albumin 09/13/2022 4.70  3.50 - 5.20 g/dL Final   • ALT (SGPT) 09/13/2022 9  1 - 33 U/L Final   • AST (SGOT) 09/13/2022 12  1 - 32 U/L Final   • Alkaline Phosphatase 09/13/2022 89  39 - 117 U/L Final   • Total Bilirubin 09/13/2022 1.1  0.0 - 1.2 mg/dL Final   • Globulin 09/13/2022 2.6  gm/dL Final   • A/G Ratio 09/13/2022 1.8  g/dL Final   • BUN/Creatinine Ratio 09/13/2022 10.4  7.0 - 25.0 Final   • Anion Gap 09/13/2022 9.7  5.0 - 15.0 mmol/L Final   • eGFR 09/13/2022 102.7  >60.0 mL/min/1.73 Final    National Kidney Foundation and American Society of Nephrology (ASN) Task Force recommended calculation based on the Chronic Kidney Disease Epidemiology Collaboration (CKD-EPI) equation refit without adjustment for race.   • Total Cholesterol 09/13/2022 206 (H)  0 - 200 mg/dL Final   • Triglycerides 09/13/2022 210 (H)  0 - 150 mg/dL Final   • HDL Cholesterol 09/13/2022 42  40 - 60 mg/dL Final   • LDL Cholesterol  09/13/2022 127 (H)  0 - 100 mg/dL Final   • VLDL Cholesterol 09/13/2022 37  5 - 40 mg/dL Final   • LDL/HDL Ratio 09/13/2022 2.90   Final   • TSH 09/13/2022 2.410  0.270 - 4.200 uIU/mL Final   • Hemoglobin A1C 09/13/2022 5.1  3.5 - 5.6 % Final       Assessment & Plan   Healthy female exam.   Diagnoses and all orders for this visit:    1. Wellness examination (Primary)      1. Well exam.  2. Patient Counseling:  --Nutrition: Stressed importance of moderation in sodium/caffeine intake, saturated fat and cholesterol, caloric balance, sufficient  intake of fresh fruits, vegetables, fiber, calcium, iron  --Exercise: Stressed the importance of regular exercise.   --Dental health: Discussed importance of regular tooth brushing, flossing, and dental visits.  --Immunizations reviewed.  --Discussed benefits of screening colonoscopy.    3. Discussed the patient's BMI with her.  The BMI is above average; BMI management plan is completed  4. Follow up in one year

## 2023-05-11 LAB
MAXIMAL PREDICTED HEART RATE: 184 BPM
STRESS TARGET HR: 156 BPM

## 2023-08-03 RX ORDER — DILTIAZEM HYDROCHLORIDE 120 MG/1
120 CAPSULE, COATED, EXTENDED RELEASE ORAL DAILY
Qty: 90 CAPSULE | Refills: 1 | Status: SHIPPED | OUTPATIENT
Start: 2023-08-03

## 2023-08-03 RX ORDER — DILTIAZEM HYDROCHLORIDE 120 MG/1
120 CAPSULE, COATED, EXTENDED RELEASE ORAL DAILY
Qty: 90 CAPSULE | Refills: 1 | Status: CANCELLED | OUTPATIENT
Start: 2023-08-03

## 2023-08-03 RX ORDER — METOPROLOL TARTRATE 50 MG/1
50 TABLET, FILM COATED ORAL EVERY 12 HOURS SCHEDULED
Qty: 180 TABLET | Refills: 1 | Status: CANCELLED | OUTPATIENT
Start: 2023-08-03

## 2023-08-03 RX ORDER — METOPROLOL TARTRATE 50 MG/1
50 TABLET, FILM COATED ORAL EVERY 12 HOURS SCHEDULED
Qty: 180 TABLET | Refills: 1 | Status: SHIPPED | OUTPATIENT
Start: 2023-08-03

## 2023-10-03 RX ORDER — ESCITALOPRAM OXALATE 10 MG/1
10 TABLET ORAL DAILY
Qty: 90 TABLET | Refills: 0 | Status: SHIPPED | OUTPATIENT
Start: 2023-10-03

## 2023-12-30 RX ORDER — ESCITALOPRAM OXALATE 10 MG/1
10 TABLET ORAL DAILY
Qty: 90 TABLET | Refills: 0 | Status: CANCELLED | OUTPATIENT
Start: 2023-12-30

## 2024-01-02 RX ORDER — ESCITALOPRAM OXALATE 10 MG/1
10 TABLET ORAL DAILY
Qty: 90 TABLET | Refills: 0 | Status: SHIPPED | OUTPATIENT
Start: 2024-01-02

## 2024-01-26 RX ORDER — METOPROLOL TARTRATE 50 MG/1
50 TABLET, FILM COATED ORAL EVERY 12 HOURS SCHEDULED
Qty: 180 TABLET | Refills: 1 | Status: CANCELLED | OUTPATIENT
Start: 2024-01-26

## 2024-01-29 ENCOUNTER — TELEPHONE (OUTPATIENT)
Dept: CARDIOLOGY | Facility: CLINIC | Age: 39
End: 2024-01-29
Payer: COMMERCIAL

## 2024-01-29 RX ORDER — METOPROLOL TARTRATE 50 MG/1
50 TABLET, FILM COATED ORAL EVERY 12 HOURS SCHEDULED
Qty: 60 TABLET | Refills: 0 | Status: SHIPPED | OUTPATIENT
Start: 2024-01-29

## 2024-01-29 NOTE — TELEPHONE ENCOUNTER
Rx Refill Note  Requested Prescriptions     Pending Prescriptions Disp Refills    metoprolol tartrate (LOPRESSOR) 50 MG tablet 60 tablet 0     Sig: Take 1 tablet by mouth Every 12 (Twelve) Hours. PT NEEDS TO SEE DR FOR MORE REFILLS      Last office visit with prescribing clinician: 9/13/2022   Last telemedicine visit with prescribing clinician: Visit date not found   Next office visit with prescribing clinician: Visit date not found                         Would you like a call back once the refill request has been completed: [] Yes [] No    If the office needs to give you a call back, can they leave a voicemail: [] Yes [] No    Tania Mustafa MA  01/29/24, 11:19 EST

## 2024-02-28 ENCOUNTER — OFFICE VISIT (OUTPATIENT)
Dept: CARDIOLOGY | Facility: CLINIC | Age: 39
End: 2024-02-28
Payer: COMMERCIAL

## 2024-02-28 VITALS
WEIGHT: 242 LBS | SYSTOLIC BLOOD PRESSURE: 107 MMHG | HEART RATE: 77 BPM | DIASTOLIC BLOOD PRESSURE: 64 MMHG | BODY MASS INDEX: 34.65 KG/M2 | OXYGEN SATURATION: 98 % | HEIGHT: 70 IN

## 2024-02-28 DIAGNOSIS — R73.9 HYPERGLYCEMIA: ICD-10-CM

## 2024-02-28 DIAGNOSIS — E66.9 OBESITY (BMI 30-39.9): ICD-10-CM

## 2024-02-28 DIAGNOSIS — R00.2 PALPITATIONS: Primary | ICD-10-CM

## 2024-02-28 DIAGNOSIS — I48.0 PAROXYSMAL ATRIAL FIBRILLATION: ICD-10-CM

## 2024-02-28 DIAGNOSIS — E78.5 DYSLIPIDEMIA: ICD-10-CM

## 2024-02-28 PROCEDURE — 93000 ELECTROCARDIOGRAM COMPLETE: CPT | Performed by: INTERNAL MEDICINE

## 2024-02-28 PROCEDURE — 99214 OFFICE O/P EST MOD 30 MIN: CPT | Performed by: INTERNAL MEDICINE

## 2024-02-28 RX ORDER — METOPROLOL SUCCINATE 100 MG/1
100 TABLET, EXTENDED RELEASE ORAL DAILY
Qty: 90 TABLET | Refills: 3 | Status: SHIPPED | OUTPATIENT
Start: 2024-02-28

## 2024-02-28 RX ORDER — DILTIAZEM HYDROCHLORIDE 120 MG/1
120 CAPSULE, COATED, EXTENDED RELEASE ORAL DAILY
Qty: 90 CAPSULE | Refills: 3 | Status: SHIPPED | OUTPATIENT
Start: 2024-02-28

## 2024-02-28 NOTE — PROGRESS NOTES
Subjective:     Encounter Date:02/28/2024      Patient ID: Tania Wolfe is a 38 y.o. female.    Chief Complaint and history of present illness:     Follow-up for palpitations, A. fib, dyslipidemia     History of Present Illness       Ms. Tania Wolfe has PMH of     Paroxysmal atrial fibrillation during pregnancy in 2018 repeat in 2011  Facial droop, Bell's palsy  Dyslipidemia with low HDL at 31  Depression  Obesity with BMI over 36  Breast surgery  NKDA  Positive family history of CAD in mother     Here for follow-up..  Patient reportedly had A. fib during pregnancy in 2008 and repeat in 2011.  States she had no documented A. fib since 2011, till recently started having infrequent palpitations.  Now patient has a iWatch has not shown any further A-fib.     Patient's arterial blood pressure is 107/64, heart rate 77 bpm O2 sat of 98% on room air.  BMI is over 30     Labs from August 2020 revealed normal TSH, T3, free T4, CMP with a glucose elevated at 132, lipid profile with cholesterol 181 triglycerides 270 HDL low at 31 LDL 96.  Labs from 6/3/2021 revealed TSH normal at 2.07, normal CMP normal magnesium.  A1c of 5.8.  10/19/2021 labs reveal normal CMP except for elevated glucose.  Normal CBC and TSH.  Labs from 9/13/2022 reveal hemoglobin A1c normal at 5.1, TSH normal at 2.41, CMP with a glucose of 110.  Lipid profile with cholesterol 206, triglycerides 210, HDL 42, .        Assessment:       Palpitations  Bradycardia  Hyperglycemia, A1c of 5.8 suggestive of prediabetes, resolved to 5.1  Paroxysmal A. Fib  Dyslipidemia with low HDL        Recommendations / Plan:         Patient is having EKG showing heart rate of 48 bpm in the ER.  Patient had echocardiogram done 6/29/2021 which revealed normal LV systolic function  Patient's [id number] score is low, therefore we will hold off on anticoagulation.  Reviewed low HDL counseled on walking exercise.  Will change metoprolol to long-acting metoprolol  succinate 100 and continue diltiazem.  Patient's heart rate was low last visit and try to decrease diltiazem patient started having palpitations therefore went back on diltiazem is doing well. Discussed about ablation and anticoagulation with patient.  If she develops hypertension or diabetes we will put her on long-term anticoagulation.  We will follow-up in 1 year or sooner if needed.  Follow-up with PMD for dyslipidemia and hyperglycemia.  Reviewed BMI over 30 counseled on weight loss diet and exercise.  Patient states she was tested for sleep apnea and was negative in the past.                ECG 12 Lead    Date/Time: 2/28/2024 11:23 AM  Performed by: Erlin Rahman MD    Authorized by: Erlin Rahman MD  Comparison: compared with previous ECG from 9/13/2022  Comparison to previous ECG: EKG done today reviewed/interpreted by me reveals sinus rhythm with a rate of 69 bpm, no new change compared EKG from 9/13/2022          Copied text in this portion of the note has been reviewed and is accurate as of 2/28/2024  The following portions of the patient's history were reviewed and updated as appropriate: allergies, current medications, past family history, past medical history, past social history, past surgical history and problem list.    Assessment:         MDM       Diagnosis Plan   1. Palpitations        2. Paroxysmal atrial fibrillation        3. Dyslipidemia        4. Obesity (BMI 30-39.9)        5. Hyperglycemia               Plan:               Past Medical History:  Past Medical History:   Diagnosis Date    Arrhythmia     Atrial fibrillation     during pregnancies    Bell palsy 01/21/2016    Congenital heart disease     Depression     Hypertension      Past Surgical History:  Past Surgical History:   Procedure Laterality Date    BREAST SURGERY  2018    breast reduction    FIBULA SOFT TISSUE BIOPSY  1999    cyst removed      Allergies:  No Known Allergies  Home Meds:  Current Meds:  "    Current Outpatient Medications:     dilTIAZem CD (CARDIZEM CD) 120 MG 24 hr capsule, Take 1 capsule by mouth Daily., Disp: 90 capsule, Rfl: 3    escitalopram (Lexapro) 10 MG tablet, Take 1 tablet by mouth Daily., Disp: 90 tablet, Rfl: 0    metoprolol succinate XL (Toprol XL) 100 MG 24 hr tablet, Take 1 tablet by mouth Daily., Disp: 90 tablet, Rfl: 3    Multiple Vitamin (MULTIVITAMIN) capsule, Take 1 capsule by mouth Daily., Disp: , Rfl:   Social History:   Social History     Tobacco Use    Smoking status: Never     Passive exposure: Never    Smokeless tobacco: Never   Substance Use Topics    Alcohol use: Not Currently     Comment: occ      Family History:  Family History   Adopted: Yes   Problem Relation Age of Onset    Heart disease Mother     Heart attack Mother     Thyroid disease Father     Diabetes Father     Hypertension Father               Review of Systems   Constitutional: Negative for malaise/fatigue.   Cardiovascular:  Positive for palpitations. Negative for chest pain and leg swelling.   Respiratory:  Negative for shortness of breath.    Skin:  Negative for rash.   Neurological:  Negative for dizziness, light-headedness and numbness.     All other systems are negative         Objective:     Physical Exam  /64   Pulse 77   Ht 177.8 cm (70\")   Wt 110 kg (242 lb)   SpO2 98%   BMI 34.72 kg/m²   General:  Appears in no acute distress  Eyes: Sclera is anicteric,  conjunctiva is clear   HEENT:  No JVD.  No carotid bruits  Respiratory: Respirations regular and unlabored at rest.  Clear to auscultation  Cardiovascular: S1,S2 Regular rate and rhythm. .   Extremities: No digital clubbing or cyanosis, no edema  Skin: Color pink. Skin warm and dry to touch. No rashes  No xanthoma  Neuro: Alert and awake.    Lab Reviewed:         Erlin Rahman MD  2/28/2024 11:26 EST      EMR Dragon/Transcription:   \"Dictated utilizing Dragon dictation\".        "

## 2024-04-02 RX ORDER — ESCITALOPRAM OXALATE 10 MG/1
10 TABLET ORAL DAILY
Qty: 90 TABLET | Refills: 0 | Status: SHIPPED | OUTPATIENT
Start: 2024-04-02

## 2024-05-22 ENCOUNTER — TELEPHONE (OUTPATIENT)
Dept: CARDIOLOGY | Facility: CLINIC | Age: 39
End: 2024-05-22
Payer: COMMERCIAL

## 2024-05-22 RX ORDER — METOPROLOL TARTRATE 50 MG/1
50 TABLET, FILM COATED ORAL 2 TIMES DAILY
Qty: 60 TABLET | Refills: 6 | Status: SHIPPED | OUTPATIENT
Start: 2024-05-22

## 2024-05-22 NOTE — TELEPHONE ENCOUNTER
Called and discussed with patient.  HR 120s in the evening after about 16 hours of taking XL dose.  Suggesting cutting XL dose in half and taking twice daily.  She prefers to go back to metoprolol tartrate 50mg BID.   Prescription sent in.     Advised holter monitor as well per Dr. Rahman.  She does not want holter at this time.

## 2024-05-22 NOTE — TELEPHONE ENCOUNTER
Taking Metoprolol xl 100 mg 1 qd, she does not feel like it is lasting as long, hr after 10-12 hrs jumps up, asking to go back to twice a day?

## 2024-07-26 RX ORDER — ESCITALOPRAM OXALATE 10 MG/1
10 TABLET ORAL DAILY
Qty: 90 TABLET | Refills: 0 | Status: SHIPPED | OUTPATIENT
Start: 2024-07-26

## 2024-09-23 RX ORDER — ESCITALOPRAM OXALATE 10 MG/1
10 TABLET ORAL DAILY
Qty: 90 TABLET | Refills: 0 | Status: SHIPPED | OUTPATIENT
Start: 2024-09-23

## 2024-12-21 RX ORDER — METOPROLOL TARTRATE 50 MG
50 TABLET ORAL 2 TIMES DAILY
Qty: 60 TABLET | Refills: 6 | Status: CANCELLED | OUTPATIENT
Start: 2024-12-21

## 2024-12-23 RX ORDER — METOPROLOL TARTRATE 50 MG
50 TABLET ORAL 2 TIMES DAILY
Qty: 180 TABLET | Refills: 0 | Status: SHIPPED | OUTPATIENT
Start: 2024-12-23

## 2024-12-23 NOTE — TELEPHONE ENCOUNTER
Rx Refill Note  Requested Prescriptions     Pending Prescriptions Disp Refills    metoprolol tartrate (LOPRESSOR) 50 MG tablet 180 tablet 0     Sig: Take 1 tablet by mouth 2 (Two) Times a Day.      Last office visit with prescribing clinician: Dr. Rahman 2/28/2024  Last telemedicine visit with prescribing clinician: Visit date not found   Next office visit with prescribing clinician: Dr. Rahman 3/5/2025                        Would you like a call back once the refill request has been completed: [] Yes [] No    If the office needs to give you a call back, can they leave a voicemail: [] Yes [] No    Justyna Mcghee MA  12/23/24, 08:26 EST

## 2025-01-07 RX ORDER — ESCITALOPRAM OXALATE 10 MG/1
10 TABLET ORAL DAILY
Qty: 90 TABLET | Refills: 0 | OUTPATIENT
Start: 2025-01-07

## 2025-01-08 RX ORDER — ESCITALOPRAM OXALATE 10 MG/1
10 TABLET ORAL DAILY
Qty: 90 TABLET | Refills: 0 | Status: CANCELLED | OUTPATIENT
Start: 2025-01-08

## 2025-01-10 RX ORDER — ESCITALOPRAM OXALATE 10 MG/1
10 TABLET ORAL DAILY
Qty: 90 TABLET | Refills: 0 | Status: SHIPPED | OUTPATIENT
Start: 2025-01-10

## 2025-03-05 ENCOUNTER — OFFICE VISIT (OUTPATIENT)
Dept: CARDIOLOGY | Facility: CLINIC | Age: 40
End: 2025-03-05
Payer: COMMERCIAL

## 2025-03-05 VITALS
HEIGHT: 70 IN | DIASTOLIC BLOOD PRESSURE: 54 MMHG | SYSTOLIC BLOOD PRESSURE: 112 MMHG | WEIGHT: 258 LBS | BODY MASS INDEX: 36.94 KG/M2 | HEART RATE: 67 BPM | OXYGEN SATURATION: 97 %

## 2025-03-05 DIAGNOSIS — I48.0 PAROXYSMAL ATRIAL FIBRILLATION: ICD-10-CM

## 2025-03-05 DIAGNOSIS — R00.2 PALPITATIONS: Primary | ICD-10-CM

## 2025-03-05 DIAGNOSIS — E66.9 OBESITY (BMI 30-39.9): ICD-10-CM

## 2025-03-05 DIAGNOSIS — R73.9 HYPERGLYCEMIA: ICD-10-CM

## 2025-03-05 DIAGNOSIS — E78.5 DYSLIPIDEMIA: ICD-10-CM

## 2025-03-05 PROCEDURE — 93000 ELECTROCARDIOGRAM COMPLETE: CPT | Performed by: INTERNAL MEDICINE

## 2025-03-05 PROCEDURE — 99214 OFFICE O/P EST MOD 30 MIN: CPT | Performed by: INTERNAL MEDICINE

## 2025-03-05 RX ORDER — DILTIAZEM HYDROCHLORIDE 120 MG/1
120 CAPSULE, COATED, EXTENDED RELEASE ORAL DAILY
Qty: 90 CAPSULE | Refills: 3 | Status: SHIPPED | OUTPATIENT
Start: 2025-03-05

## 2025-03-05 RX ORDER — METOPROLOL TARTRATE 50 MG
50 TABLET ORAL 2 TIMES DAILY
Qty: 180 TABLET | Refills: 0 | Status: SHIPPED | OUTPATIENT
Start: 2025-03-05

## 2025-03-05 NOTE — PROGRESS NOTES
Subjective:     Encounter Date:03/05/2025      Patient ID: Tania Wolfe is a 39 y.o. female.    Chief Complaint and history of present illness:     Follow-up for palpitations, A. fib, dyslipidemia, obesity     History of Present Illness       Ms. Tania Wolfe has PMH of     Paroxysmal atrial fibrillation during pregnancy in 2018 repeat in 2011  [id number] SCORE   No data recorded     Facial droop, Bell's palsy  Dyslipidemia with low HDL at 31  Depression  Obesity with BMI over 36  Breast surgery  NKDA  Positive family history of CAD in mother     Here for follow-up..  Patient reportedly had A. fib during pregnancy in 2008 and repeat in 2011.  States she had no documented A. fib since 2011, till recently started having infrequent palpitations.  Now patient has a iWatch has not shown any further A-fib.  Since then patient has been having very infrequent palpitations.  She feels like she has a skip beat.  Is not bothering her too much.  Stress at work causes her to have palpitations sometimes and heart rate goes up to 180 for short period.     Patient's arterial blood pressure is 112/54, heart rate 67 bpm, O2 sat of 97% on room air.  BMI is over 30     Labs from August 2020 revealed normal TSH, T3, free T4, CMP with a glucose elevated at 132, lipid profile with cholesterol 181 triglycerides 270 HDL low at 31 LDL 96.  Labs from 6/3/2021 revealed TSH normal at 2.07, normal CMP normal magnesium.  A1c of 5.8.  10/19/2021 labs reveal normal CMP except for elevated glucose.  Normal CBC and TSH.  Labs from 9/13/2022 reveal hemoglobin A1c normal at 5.1, TSH normal at 2.41, CMP with a glucose of 110.  Lipid profile with cholesterol 206, triglycerides 210, HDL 42, .        Assessment:       Palpitations  Bradycardia  Hyperglycemia, A1c of 5.8 suggestive of prediabetes, resolved to 5.1  Paroxysmal A. Fib  Dyslipidemia with low HDL        Recommendations / Plan:         Reviewed EKG results with  patient.  Patient is having intermittent palpitations but is not bothering her.  Patient had echocardiogram 6/29/2021 which revealed normal LV systolic function.  Patient's [id number] score is low, therefore we will hold off on anticoagulation.  Will recheck hemoglobin A1c if patient becomes diabetic will start her on anticoagulation.  Patient previously wanted to have labs done with PMD but has not done any since 2022.  Will order lipid profile also.  Continue medical management with diltiazem  and metoprolol to tartrate 50 twice daily as tolerated.  Patient previously was changed to long-acting metoprolol succinate she did not like it, was having breakthrough palpitations and likes to short-acting 50 twice daily better.  Reviewed BMI over 30, counseled on weight loss diet and exercise.  Advised follow-up with PMD for prediabetes.  Patient states that she was previously tested for sleep apnea and was negative in the past.           ECG 12 Lead    Date/Time: 3/5/2025 8:10 AM  Performed by: Erlin Rahman MD    Authorized by: Erlin Rahman MD  Comparison: compared with previous ECG from 2/28/2024  Comparison to previous ECG: EKG done today reviewed/interpreted by me reveals sinus rhythm with a rate of 65 bpm, no significant change compared to EKG from 2/28/2024          ECHOCARDIOGRAM:  Results for orders placed during the hospital encounter of 06/29/21    Adult Transthoracic Echo Complete W/ Cont if Necessary Per Protocol    Interpretation Summary  Normal LV size and contractility EF of 60 to 65%  Normal RV size  Normal atrial size  Aortic valve, mitral valve, tricuspid valve appears structurally normal, no significant regurgitation seen.  No pericardial effusion seen.  Proximal aorta appears normal in size.      STRESS TEST  Results for orders placed during the hospital encounter of 10/09/21    Stress Test With Myocardial Perfusion One Day    Interpretation Summary  Indications  Chest  pain  Atrial fibrillation    This study was performed under direct supervision of Elysia Ríos.    Resting ECG  Sinus rhythm    The patient was injected with Lexiscan intravenously while constantly monitoring electrocardiogram and vital signs.  Patient did not have any chest discomfort ST abnormalities or ectopy with injection of Lexiscan.    Cardiolite was used as an imaging agent.    Cardiolite images showed uniform distribution of radionuclide without any evidence for myocardial ischemia.    Gated SPECT images revealed normal left ventricle size and contractility with ejection fraction of 74%.    Impression  ========  Lexiscan Cardiolite test is negative for myocardial ischemia.    Gated SPECT images revealed normal left ventricular size and contractility with ejection fraction of 74%.          HEART CATHETERIZATION  No results found for this or any previous visit.      Copied text in this portion of the note has been reviewed and is accurate as of 3/8/2025  The following portions of the patient's history were reviewed and updated as appropriate: allergies, current medications, past family history, past medical history, past social history, past surgical history and problem list.    Assessment:         MDM       Diagnosis Plan   1. Palpitations  Comprehensive Metabolic Panel    Lipid Panel    TSH    Hemoglobin A1c      2. Paroxysmal atrial fibrillation  Comprehensive Metabolic Panel    Lipid Panel    TSH    Hemoglobin A1c      3. Dyslipidemia  Comprehensive Metabolic Panel    Lipid Panel    TSH    Hemoglobin A1c      4. Hyperglycemia  Comprehensive Metabolic Panel    Lipid Panel    TSH    Hemoglobin A1c      5. Obesity (BMI 30-39.9)  Comprehensive Metabolic Panel    Lipid Panel    TSH    Hemoglobin A1c             Plan:               Past Medical History:  Past Medical History:   Diagnosis Date    Arrhythmia     Atrial fibrillation     during pregnancies    Bell palsy 01/21/2016    Congenital heart disease      "Depression     Hypertension      Past Surgical History:  Past Surgical History:   Procedure Laterality Date    BREAST SURGERY  2018    breast reduction    FIBULA SOFT TISSUE BIOPSY  1999    cyst removed      Allergies:  No Known Allergies  Home Meds:  Current Meds:     Current Outpatient Medications:     amoxicillin (AMOXIL) 500 MG capsule, Take 1 capsule by mouth every eight hours, Disp: 30 capsule, Rfl: 0    dilTIAZem CD (CARDIZEM CD) 120 MG 24 hr capsule, Take 1 capsule by mouth Daily., Disp: 90 capsule, Rfl: 3    escitalopram (Lexapro) 10 MG tablet, Take 1 tablet by mouth Daily., Disp: 90 tablet, Rfl: 0    metoprolol tartrate (LOPRESSOR) 50 MG tablet, Take 1 tablet by mouth 2 (Two) Times a Day., Disp: 180 tablet, Rfl: 0    Multiple Vitamin (MULTIVITAMIN) capsule, Take 1 capsule by mouth Daily., Disp: , Rfl:     methylPREDNISolone (Medrol) 4 MG dose pack, Please complete the Medrol dose pack by mouth as instructed, Disp: 21 tablet, Rfl: 0    triamcinolone (KENALOG) 0.5 % cream, Apply a small amount to affected area twice a day as needed, Disp: 15 g, Rfl: 0  Social History:   Social History     Tobacco Use    Smoking status: Never     Passive exposure: Never    Smokeless tobacco: Never   Substance Use Topics    Alcohol use: Not Currently     Comment: occ      Family History:  Family History   Adopted: Yes   Problem Relation Age of Onset    Heart disease Mother     Heart attack Mother     Thyroid disease Father     Diabetes Father     Hypertension Father               Review of Systems   Constitutional: Negative for malaise/fatigue.   Cardiovascular:  Positive for palpitations. Negative for chest pain and leg swelling.   Respiratory:  Negative for shortness of breath.    Skin:  Negative for rash.   Neurological:  Negative for dizziness, light-headedness and numbness.     All other systems are negative         Objective:     Physical Exam  /54   Pulse 67   Ht 177.8 cm (70\")   Wt 117 kg (258 lb)   SpO2 97%  " " BMI 37.02 kg/m²   General:  Appears in no acute distress  Eyes: Sclera is anicteric,  conjunctiva is clear   HEENT:  No JVD.  No carotid bruits  Respiratory: Respirations regular and unlabored at rest.  Clear to auscultation  Cardiovascular: S1,S2 Regular rate and rhythm. .   Extremities: No digital clubbing or cyanosis, no edema  Skin: Color pink. Skin warm and dry to touch. No rashes  No xanthoma  Neuro: Alert and awake.    Lab Reviewed:         Erlin Rahman MD  3/8/2025 08:17 EST      EMR Dragon/Transcription:   \"Dictated utilizing Dragon dictation\".        "

## 2025-03-13 ENCOUNTER — LAB (OUTPATIENT)
Dept: LAB | Facility: HOSPITAL | Age: 40
End: 2025-03-13
Payer: COMMERCIAL

## 2025-03-13 DIAGNOSIS — R73.9 HYPERGLYCEMIA: ICD-10-CM

## 2025-03-13 DIAGNOSIS — E78.5 DYSLIPIDEMIA: ICD-10-CM

## 2025-03-13 DIAGNOSIS — E66.9 OBESITY (BMI 30-39.9): ICD-10-CM

## 2025-03-13 DIAGNOSIS — R00.2 PALPITATIONS: ICD-10-CM

## 2025-03-13 DIAGNOSIS — I48.0 PAROXYSMAL ATRIAL FIBRILLATION: ICD-10-CM

## 2025-03-13 LAB
ALBUMIN SERPL-MCNC: 3.9 G/DL (ref 3.5–5.2)
ALBUMIN/GLOB SERPL: 1.2 G/DL
ALP SERPL-CCNC: 80 U/L (ref 39–117)
ALT SERPL W P-5'-P-CCNC: 12 U/L (ref 1–33)
ANION GAP SERPL CALCULATED.3IONS-SCNC: 9.6 MMOL/L (ref 5–15)
AST SERPL-CCNC: 18 U/L (ref 1–32)
BILIRUB SERPL-MCNC: 0.5 MG/DL (ref 0–1.2)
BUN SERPL-MCNC: 10 MG/DL (ref 6–20)
BUN/CREAT SERPL: 10.9 (ref 7–25)
CALCIUM SPEC-SCNC: 10 MG/DL (ref 8.6–10.5)
CHLORIDE SERPL-SCNC: 99 MMOL/L (ref 98–107)
CHOLEST SERPL-MCNC: 197 MG/DL (ref 0–200)
CO2 SERPL-SCNC: 29.4 MMOL/L (ref 22–29)
CREAT SERPL-MCNC: 0.92 MG/DL (ref 0.57–1)
EGFRCR SERPLBLD CKD-EPI 2021: 81.4 ML/MIN/1.73
GLOBULIN UR ELPH-MCNC: 3.3 GM/DL
GLUCOSE SERPL-MCNC: 108 MG/DL (ref 65–99)
HBA1C MFR BLD: 6.8 % (ref 4.8–5.6)
HDLC SERPL-MCNC: 32 MG/DL (ref 40–60)
LDLC SERPL CALC-MCNC: 111 MG/DL (ref 0–100)
LDLC/HDLC SERPL: 3.2 {RATIO}
POTASSIUM SERPL-SCNC: 4.3 MMOL/L (ref 3.5–5.2)
PROT SERPL-MCNC: 7.2 G/DL (ref 6–8.5)
SODIUM SERPL-SCNC: 138 MMOL/L (ref 136–145)
TRIGL SERPL-MCNC: 313 MG/DL (ref 0–150)
TSH SERPL DL<=0.05 MIU/L-ACNC: 2.44 UIU/ML (ref 0.27–4.2)
VLDLC SERPL-MCNC: 54 MG/DL (ref 5–40)

## 2025-03-13 PROCEDURE — 83036 HEMOGLOBIN GLYCOSYLATED A1C: CPT

## 2025-03-13 PROCEDURE — 36415 COLL VENOUS BLD VENIPUNCTURE: CPT

## 2025-03-13 PROCEDURE — 84443 ASSAY THYROID STIM HORMONE: CPT

## 2025-03-13 PROCEDURE — 80053 COMPREHEN METABOLIC PANEL: CPT

## 2025-03-13 PROCEDURE — 80061 LIPID PANEL: CPT

## 2025-03-19 ENCOUNTER — LAB (OUTPATIENT)
Dept: FAMILY MEDICINE CLINIC | Facility: CLINIC | Age: 40
End: 2025-03-19
Payer: COMMERCIAL

## 2025-03-19 ENCOUNTER — RESULTS FOLLOW-UP (OUTPATIENT)
Dept: CARDIOLOGY | Facility: CLINIC | Age: 40
End: 2025-03-19
Payer: COMMERCIAL

## 2025-03-19 ENCOUNTER — OFFICE VISIT (OUTPATIENT)
Dept: FAMILY MEDICINE CLINIC | Facility: CLINIC | Age: 40
End: 2025-03-19
Payer: COMMERCIAL

## 2025-03-19 VITALS
TEMPERATURE: 97.7 F | HEART RATE: 71 BPM | WEIGHT: 257.6 LBS | RESPIRATION RATE: 18 BRPM | OXYGEN SATURATION: 96 % | SYSTOLIC BLOOD PRESSURE: 114 MMHG | BODY MASS INDEX: 36.88 KG/M2 | HEIGHT: 70 IN | DIASTOLIC BLOOD PRESSURE: 77 MMHG

## 2025-03-19 DIAGNOSIS — E11.65 TYPE 2 DIABETES MELLITUS WITH HYPERGLYCEMIA, WITHOUT LONG-TERM CURRENT USE OF INSULIN: ICD-10-CM

## 2025-03-19 DIAGNOSIS — Z00.00 WELLNESS EXAMINATION: Primary | ICD-10-CM

## 2025-03-19 LAB
ALBUMIN UR-MCNC: <1.2 MG/DL
CREAT UR-MCNC: 281.5 MG/DL
MICROALBUMIN/CREAT UR: NORMAL MG/G{CREAT}

## 2025-03-19 PROCEDURE — 82570 ASSAY OF URINE CREATININE: CPT | Performed by: FAMILY MEDICINE

## 2025-03-19 PROCEDURE — 82043 UR ALBUMIN QUANTITATIVE: CPT | Performed by: FAMILY MEDICINE

## 2025-03-19 PROCEDURE — 99395 PREV VISIT EST AGE 18-39: CPT | Performed by: FAMILY MEDICINE

## 2025-03-19 RX ORDER — ESCITALOPRAM OXALATE 10 MG/1
10 TABLET ORAL DAILY
Qty: 90 TABLET | Refills: 3 | Status: SHIPPED | OUTPATIENT
Start: 2025-03-19

## 2025-03-19 NOTE — ASSESSMENT & PLAN NOTE
Diabetes is newly identified.   Medication changes per orders.  Recommended an ADA diet.  Regular aerobic exercise.  Reminded to get yearly retinal exam.  Diabetes will be reassessed in 6 months

## 2025-03-19 NOTE — PROGRESS NOTES
"Subjective   Tania Wolfe is a 39 y.o. female and is here for a comprehensive physical exam. The patient reports problems - she had labs done recently that showed elevated A1C .    Do you take any herbs or supplements that were not prescribed by a doctor? no  Are you taking calcium supplements? no  Are you taking aspirin daily? no    The following portions of the patient's history were reviewed and updated as appropriate: allergies, current medications, past family history, past medical history, past social history, past surgical history, and problem list.    Review of Systems  Do you have pain that bothers you in your daily life? not asked  Pertinent items are noted in HPI.    Objective   /77 (BP Location: Left arm, Patient Position: Sitting, Cuff Size: Large Adult)   Pulse 71   Temp 97.7 °F (36.5 °C) (Temporal)   Resp 18   Ht 177.8 cm (70\")   Wt 117 kg (257 lb 9.6 oz)   SpO2 96%   BMI 36.96 kg/m²   General appearance: alert, appears stated age, and cooperative  Head: Normocephalic, without obvious abnormality, atraumatic  Eyes: conjunctivae/corneas clear. PERRL, EOM's intact. Fundi benign.  Ears: normal TM's and external ear canals both ears  Throat: lips, mucosa, and tongue normal; teeth and gums normal  Neck: no adenopathy, no JVD, supple, symmetrical, trachea midline, and thyroid not enlarged, symmetric, no tenderness/mass/nodules  Lungs: clear to auscultation bilaterally  Heart: regular rate and rhythm, S1, S2 normal, no murmur, click, rub or gallop  Extremities: extremities normal, atraumatic, no cyanosis or edema  Skin: Skin color, texture, turgor normal. No rashes or lesions  Lymph nodes: Cervical, supraclavicular, and axillary nodes normal.  Neurologic: Grossly normal     Lab on 03/13/2025   Component Date Value Ref Range Status    Glucose 03/13/2025 108 (H)  65 - 99 mg/dL Final    BUN 03/13/2025 10  6 - 20 mg/dL Final    Creatinine 03/13/2025 0.92  0.57 - 1.00 mg/dL Final    Sodium " 03/13/2025 138  136 - 145 mmol/L Final    Potassium 03/13/2025 4.3  3.5 - 5.2 mmol/L Final    Chloride 03/13/2025 99  98 - 107 mmol/L Final    CO2 03/13/2025 29.4 (H)  22.0 - 29.0 mmol/L Final    Calcium 03/13/2025 10.0  8.6 - 10.5 mg/dL Final    Total Protein 03/13/2025 7.2  6.0 - 8.5 g/dL Final    Albumin 03/13/2025 3.9  3.5 - 5.2 g/dL Final    ALT (SGPT) 03/13/2025 12  1 - 33 U/L Final    AST (SGOT) 03/13/2025 18  1 - 32 U/L Final    Alkaline Phosphatase 03/13/2025 80  39 - 117 U/L Final    Total Bilirubin 03/13/2025 0.5  0.0 - 1.2 mg/dL Final    Globulin 03/13/2025 3.3  gm/dL Final    A/G Ratio 03/13/2025 1.2  g/dL Final    BUN/Creatinine Ratio 03/13/2025 10.9  7.0 - 25.0 Final    Anion Gap 03/13/2025 9.6  5.0 - 15.0 mmol/L Final    eGFR 03/13/2025 81.4  >60.0 mL/min/1.73 Final    Total Cholesterol 03/13/2025 197  0 - 200 mg/dL Final    Triglycerides 03/13/2025 313 (H)  0 - 150 mg/dL Final    HDL Cholesterol 03/13/2025 32 (L)  40 - 60 mg/dL Final    LDL Cholesterol  03/13/2025 111 (H)  0 - 100 mg/dL Final    VLDL Cholesterol 03/13/2025 54 (H)  5 - 40 mg/dL Final    LDL/HDL Ratio 03/13/2025 3.20   Final    TSH 03/13/2025 2.440  0.270 - 4.200 uIU/mL Final    Hemoglobin A1C 03/13/2025 6.80 (H)  4.80 - 5.60 % Final       Assessment & Plan   Healthy female exam.   Diagnoses and all orders for this visit:    1. Wellness examination (Primary)    2. Type 2 diabetes mellitus with hyperglycemia, without long-term current use of insulin  Assessment & Plan:  Diabetes is newly identified.   Medication changes per orders.  Recommended an ADA diet.  Regular aerobic exercise.  Reminded to get yearly retinal exam.  Diabetes will be reassessed in 6 months    Orders:  -     Tirzepatide 2.5 MG/0.5ML solution auto-injector; Inject 2.5 mg under the skin into the appropriate area as directed 1 (One) Time Per Week.  Dispense: 2 mL; Refill: 0  -     Microalbumin / Creatinine Urine Ratio - Urine, Clean Catch    Other orders  -      escitalopram (Lexapro) 10 MG tablet; Take 1 tablet by mouth Daily.  Dispense: 90 tablet; Refill: 3      1. Well exam.   2. Patient Counseling:  --Nutrition: Stressed importance of moderation in sodium/caffeine intake, saturated fat and cholesterol, caloric balance, sufficient intake of fresh fruits, vegetables, fiber, calcium, iron  --Exercise: Stressed the importance of regular exercise.   --Dental health: Discussed importance of regular tooth brushing, flossing, and dental visits.  --Immunizations reviewed.  --Discussed benefits of screening colonoscopy.    3. Discussed the patient's BMI with her.  The BMI is above average; BMI management plan is completed  4. Follow up in one year

## 2025-03-19 NOTE — TELEPHONE ENCOUNTER
Discussed with Patient regarding labs elevated triglycerides being elevated, hgb A1C 6.8  patient was started on diabetic medication today.  GLP injection and will be getting repeat labs in 2-3 months.            Patient reports she has not had any Afib since Oct 2023    Discuss [id number] Score: 3 (3/19/2025  3:39 PM)  Female gender, hypertension, and now diabetes     I advised patient to start Eliquis however she would like to do aspirin low given her low risk.   Discussed risk of stroke and she understands.     She also discussed with Dr. Rahman at recent visit.         Electronically signed by KIMBERLY Carmona, 03/19/25, 4:04 PM EDT.

## 2025-03-25 ENCOUNTER — TELEPHONE (OUTPATIENT)
Dept: FAMILY MEDICINE CLINIC | Facility: CLINIC | Age: 40
End: 2025-03-25
Payer: COMMERCIAL

## 2025-03-25 ENCOUNTER — PATIENT ROUNDING (BHMG ONLY) (OUTPATIENT)
Dept: FAMILY MEDICINE CLINIC | Facility: CLINIC | Age: 40
End: 2025-03-25
Payer: COMMERCIAL

## 2025-03-25 NOTE — TELEPHONE ENCOUNTER
Caller: Tania Wolfe    Relationship to patient: Self      Best call back number: [phone]    Provider:   DR MOSLEY    Medication PA needed:   Tirzepatide 2.5 MG/0.5ML solution auto-injector     Reason for call/Prior Auth:   PATIENTS INSURANCE NEEDS PRIOR AUTH.

## 2025-03-26 ENCOUNTER — PRIOR AUTHORIZATION (OUTPATIENT)
Dept: FAMILY MEDICINE CLINIC | Facility: CLINIC | Age: 40
End: 2025-03-26
Payer: COMMERCIAL

## 2025-03-26 ENCOUNTER — PATIENT MESSAGE (OUTPATIENT)
Dept: FAMILY MEDICINE CLINIC | Facility: CLINIC | Age: 40
End: 2025-03-26
Payer: COMMERCIAL

## 2025-03-26 RX ORDER — SEMAGLUTIDE 0.68 MG/ML
0.25 INJECTION, SOLUTION SUBCUTANEOUS WEEKLY
Qty: 3 ML | Refills: 0 | Status: SHIPPED | OUTPATIENT
Start: 2025-03-26

## 2025-03-26 NOTE — TELEPHONE ENCOUNTER
PA FOR MOUJARO  WAS SENT IN [facility] TO [facility]    KEY: [id number]    03/26/25   James Hoskins MA

## 2025-03-31 ENCOUNTER — TELEPHONE (OUTPATIENT)
Dept: FAMILY MEDICINE CLINIC | Facility: CLINIC | Age: 40
End: 2025-03-31
Payer: COMMERCIAL

## 2025-03-31 NOTE — TELEPHONE ENCOUNTER
Caller: Tania Wolfe    Relationship to patient: Self    Best call back number: [phone]    Patient is needing:   STATE THE PRESCRIPTION FOR OZEMPIC WILL REQUIRE A PA.     SHE WOULD LIKE TO KNOW IF THAT HAS BEEN STARTED, AND IF SO. SHE WOULD LIKE A A STATUS UPDATE ON THAT.

## 2025-06-16 RX ORDER — METOPROLOL TARTRATE 50 MG
50 TABLET ORAL 2 TIMES DAILY
Qty: 180 TABLET | Refills: 3 | Status: SHIPPED | OUTPATIENT
Start: 2025-06-16

## 2025-06-16 NOTE — TELEPHONE ENCOUNTER
Rx Refill Note  Requested Prescriptions     Pending Prescriptions Disp Refills    metoprolol tartrate (LOPRESSOR) 50 MG tablet 180 tablet 3     Sig: Take 1 tablet by mouth 2 (Two) Times a Day.      Last office visit with prescribing clinician: 3/5/2025   Last telemedicine visit with prescribing clinician: Visit date not found   Next office visit with prescribing clinician: 3/4/2026                         Would you like a call back once the refill request has been completed: [] Yes [] No    If the office needs to give you a call back, can they leave a voicemail: [] Yes [] No    Tania Mustafa MA  06/16/25, 12:18 EDT

## 2025-08-22 ENCOUNTER — PATIENT MESSAGE (OUTPATIENT)
Dept: FAMILY MEDICINE CLINIC | Facility: CLINIC | Age: 40
End: 2025-08-22
Payer: COMMERCIAL

## 2025-08-22 DIAGNOSIS — E11.65 TYPE 2 DIABETES MELLITUS WITH HYPERGLYCEMIA, WITHOUT LONG-TERM CURRENT USE OF INSULIN: Primary | ICD-10-CM
